# Patient Record
Sex: FEMALE | Race: WHITE | NOT HISPANIC OR LATINO | Employment: UNEMPLOYED | URBAN - METROPOLITAN AREA
[De-identification: names, ages, dates, MRNs, and addresses within clinical notes are randomized per-mention and may not be internally consistent; named-entity substitution may affect disease eponyms.]

---

## 2022-11-11 ENCOUNTER — OFFICE VISIT (OUTPATIENT)
Dept: MEDICAL GROUP | Facility: PHYSICIAN GROUP | Age: 62
End: 2022-11-11
Payer: COMMERCIAL

## 2022-11-11 VITALS
WEIGHT: 163.6 LBS | RESPIRATION RATE: 16 BRPM | DIASTOLIC BLOOD PRESSURE: 60 MMHG | TEMPERATURE: 97.8 F | SYSTOLIC BLOOD PRESSURE: 122 MMHG | HEART RATE: 74 BPM | OXYGEN SATURATION: 98 % | BODY MASS INDEX: 24.23 KG/M2 | HEIGHT: 69 IN

## 2022-11-11 DIAGNOSIS — Z01.84 IMMUNITY STATUS TESTING: ICD-10-CM

## 2022-11-11 DIAGNOSIS — Z00.00 HEALTHCARE MAINTENANCE: ICD-10-CM

## 2022-11-11 DIAGNOSIS — B07.9 VIRAL WARTS, UNSPECIFIED TYPE: ICD-10-CM

## 2022-11-11 DIAGNOSIS — Z23 NEED FOR VACCINATION: ICD-10-CM

## 2022-11-11 DIAGNOSIS — Z12.31 ENCOUNTER FOR SCREENING MAMMOGRAM FOR MALIGNANT NEOPLASM OF BREAST: ICD-10-CM

## 2022-11-11 DIAGNOSIS — R31.9 HEMATURIA, UNSPECIFIED TYPE: ICD-10-CM

## 2022-11-11 DIAGNOSIS — Z11.59 ENCOUNTER FOR HEPATITIS C SCREENING TEST FOR LOW RISK PATIENT: ICD-10-CM

## 2022-11-11 LAB
APPEARANCE UR: NORMAL
BILIRUB UR STRIP-MCNC: NEGATIVE MG/DL
COLOR UR AUTO: NORMAL
GLUCOSE UR STRIP.AUTO-MCNC: NEGATIVE MG/DL
KETONES UR STRIP.AUTO-MCNC: NORMAL MG/DL
LEUKOCYTE ESTERASE UR QL STRIP.AUTO: NORMAL
NITRITE UR QL STRIP.AUTO: NEGATIVE
PH UR STRIP.AUTO: 6.5 [PH] (ref 5–8)
PROT UR QL STRIP: NEGATIVE MG/DL
RBC UR QL AUTO: NEGATIVE
SP GR UR STRIP.AUTO: 1.02
UROBILINOGEN UR STRIP-MCNC: NORMAL MG/DL

## 2022-11-11 PROCEDURE — 17110 DESTRUCTION B9 LES UP TO 14: CPT | Performed by: STUDENT IN AN ORGANIZED HEALTH CARE EDUCATION/TRAINING PROGRAM

## 2022-11-11 PROCEDURE — 90715 TDAP VACCINE 7 YRS/> IM: CPT | Performed by: STUDENT IN AN ORGANIZED HEALTH CARE EDUCATION/TRAINING PROGRAM

## 2022-11-11 PROCEDURE — 99204 OFFICE O/P NEW MOD 45 MIN: CPT | Mod: 25 | Performed by: STUDENT IN AN ORGANIZED HEALTH CARE EDUCATION/TRAINING PROGRAM

## 2022-11-11 PROCEDURE — 90471 IMMUNIZATION ADMIN: CPT | Performed by: STUDENT IN AN ORGANIZED HEALTH CARE EDUCATION/TRAINING PROGRAM

## 2022-11-11 PROCEDURE — 81002 URINALYSIS NONAUTO W/O SCOPE: CPT | Performed by: STUDENT IN AN ORGANIZED HEALTH CARE EDUCATION/TRAINING PROGRAM

## 2022-11-11 RX ORDER — CEPHALEXIN 500 MG/1
500 CAPSULE ORAL 2 TIMES DAILY
Qty: 6 CAPSULE | Refills: 0 | Status: SHIPPED | OUTPATIENT
Start: 2022-11-11 | End: 2022-12-07

## 2022-11-11 RX ORDER — LORATADINE 10 MG/1
TABLET ORAL
COMMUNITY

## 2022-11-11 ASSESSMENT — PATIENT HEALTH QUESTIONNAIRE - PHQ9: CLINICAL INTERPRETATION OF PHQ2 SCORE: 0

## 2022-11-11 NOTE — PROGRESS NOTES
Subjective:     CC:  Diagnoses of Hematuria, unspecified type, Encounter for screening mammogram for malignant neoplasm of breast, Need for vaccination, Encounter for hepatitis C screening test for low risk patient, Immunity status testing, Healthcare maintenance, and Viral warts, unspecified type were pertinent to this visit.    HISTORY OF THE PRESENT ILLNESS: Patient is a 62 y.o. female.  This patient is new to me today.  She does not have a current woman's health provider.    This pleasant patient is here today to discuss:    1. Hematuria, unspecified type  Symptoms developed 2 days ago.  Burning sensation with urination.  Suprapubic pain.  Changes in the color and odor of her urine including what she believed to be blood.  She denies flank pain or fever.    2. Encounter for screening mammogram for malignant neoplasm of breast    3. Need for vaccination  Patient desires her Tdap vaccine today.  She is considering the Shingrix vaccine.  She declines influenza vaccine.    4. Encounter for hepatitis C screening test for low risk patient    5. Immunity status testing  Patient believes that she received the hepatitis B vaccine as it was required to be educated.    6. Healthcare maintenance    7. Viral warts, unspecified type  Left side ring finger palmar side.    Active Diagnosis:    There is no problem list on file for this patient.     Current Outpatient Medications Ordered in Epic   Medication Sig Dispense Refill    loratadine (CLARITIN) 10 MG Tab Take  by mouth.      cephALEXin (KEFLEX) 500 MG Cap Take 1 Capsule by mouth 2 times a day. 6 Capsule 0    Zoster Vac Recomb Adjuvanted (SHINGRIX) 50 MCG/0.5ML Recon Susp Inject 0.5 mL into the shoulder, thigh, or buttocks one time for 1 dose. 0.5 mL 0     No current Epic-ordered facility-administered medications on file.     ROS:   Gen: No fevers/chills, no changes in weight  HEENT: No changes in vision/hearing, sore throat.  Pulm: No cough, unexplained SOB.  CV: No chest  "pain/pressure, no palpitations  GI: No nausea/vomiting, no diarrhea  : See HPI.  MSk: No myalgias  Skin: No rash/skin changes.  See HPI.  Neuro: No headaches, no numbness/tingling  Heme/Lymph: no easy bruising      Objective:     Exam: /60 (BP Location: Left arm, Patient Position: Sitting, BP Cuff Size: Adult)   Pulse 74   Temp 36.6 °C (97.8 °F) (Temporal)   Resp 16   Ht 1.74 m (5' 8.5\")   Wt 74.2 kg (163 lb 9.6 oz)   SpO2 98%  Body mass index is 24.51 kg/m².    General: Normal appearing. No distress.  HEENT: Normocephalic. Eyes conjunctiva clear lids without ptosis. Pupils equal and reactive to light accommodation. Ears normal shape and contour. Canals are clear bilaterally, tympanic membranes are benign. Nasal mucosa benign, oropharynx is without erythema, edema or exudates.   Neck: Supple without JVD. Thyroid is not enlarged.  Pulmonary: Clear to ausculation.  Normal effort. No rales, ronchi, or wheezing.  Cardiovascular: Regular rate and rhythm without murmur. Radial pulses are intact and equal bilaterally.  Abdomen: Nondistended   neurologic: Grossly nonfocal.  CN II through XII intact.  Lymph: No cervical or supraclavicular lymph nodes are palpable  Skin: Warm and dry.  Obvious viral wart of the left ring finger, palmar side.  Musculoskeletal: Normal gait. No extremity cyanosis, clubbing, or edema.  Psych: Normal mood and affect. Alert and oriented x3. Judgment and insight are normal.    A chaperone was offered to the patient during today's exam. Patient declined chaperone.    Labs:   11/11/2022:  -POCT UA, glucose negative, bilirubin negative, ketones trace, specific gravity 1.020, blood negative, pH 6.5, protein negative, urobilinogen 1.0, nitrate negative, leukocyte esterase small.    Assessment & Plan:   62 y.o. female with the following -    1. Hematuria, unspecified type  - Acute uncomplicated illness.  - POCT Urinalysis  - cephALEXin (KEFLEX) 500 MG Cap; Take 1 Capsule by mouth 2 times a " day.  Dispense: 6 Capsule; Refill: 0    2. Encounter for screening mammogram for malignant neoplasm of breast  - MA-SCREENING MAMMO BILAT W/TOMOSYNTHESIS W/CAD; Future    3. Need for vaccination  - Tdap =>8yo IM vaccine provided in clinic today.  - Zoster Vac Recomb Adjuvanted (SHINGRIX) 50 MCG/0.5ML Recon Susp; Inject 0.5 mL into the shoulder, thigh, or buttocks one time for 1 dose.  Dispense: 0.5 mL; Refill: 0 Patient was provided with education about this vaccine.    4. Encounter for hepatitis C screening test for low risk patient  - HCV Scrn ( [id number] 1xLife); Future    5. Immunity status testing  - HEP B SURFACE AB; Future    6. Healthcare maintenance  -Patient believes she has 3 more years before she is due for another colonoscopy.  She was given a 10-year return time.  I have asked her to return records.  - CBC WITHOUT DIFFERENTIAL; Future  - Comp Metabolic Panel; Future  - Lipid Profile; Future  - VITAMIN D,25 HYDROXY (DEFICIENCY); Future    7. Viral warts, unspecified type  -Acute uncomplicated illness.  -Cryotherapy provided in office today, see procedure note.    Return in about 2 weeks (around 2022) for PAP.    Please note that this dictation was created using voice recognition software. I have made every reasonable attempt to correct obvious errors, but I expect that there are errors of grammar and possibly content that I did not discover before finalizing the note.      Bernard Reyse PA-C 2022    I have reviewed and agree with history, assessment and plan for office encounter on 2022 with Advanced Practice Provider: Bernard Reyes PA-C.  Face to face encounter/direct observation: No  Suggested changes to plan or follow-up: none   Kika Waggoner M.D.

## 2022-11-11 NOTE — PROCEDURES
CRYOTHERAPY:  Discussed the benign nature of these lesions.  After discussing risks and benefits, verbal consent was obtained and cryotherapy performed using liquid nitrogen, freeze-thaw-freeze technique x 3.  Patient tolerated the procedure well.  Aftercare as well as potential for blistering was discussed.  I explained that the procedure may need to be repeated if there is not complete resolution.    Location:  Left ring finger, plantar side.  1 lesion.    Bernard Reyes PA-C.

## 2022-12-07 ENCOUNTER — OFFICE VISIT (OUTPATIENT)
Dept: MEDICAL GROUP | Facility: PHYSICIAN GROUP | Age: 62
End: 2022-12-07
Payer: COMMERCIAL

## 2022-12-07 ENCOUNTER — HOSPITAL ENCOUNTER (OUTPATIENT)
Facility: MEDICAL CENTER | Age: 62
End: 2022-12-07
Attending: FAMILY MEDICINE
Payer: COMMERCIAL

## 2022-12-07 VITALS
DIASTOLIC BLOOD PRESSURE: 60 MMHG | BODY MASS INDEX: 23.99 KG/M2 | HEIGHT: 69 IN | TEMPERATURE: 97.2 F | WEIGHT: 162 LBS | OXYGEN SATURATION: 96 % | HEART RATE: 69 BPM | SYSTOLIC BLOOD PRESSURE: 138 MMHG

## 2022-12-07 DIAGNOSIS — Z12.4 SCREENING FOR CERVICAL CANCER: ICD-10-CM

## 2022-12-07 DIAGNOSIS — Z01.419 WELL WOMAN EXAM: Primary | ICD-10-CM

## 2022-12-07 DIAGNOSIS — Z11.51 SCREENING FOR HPV (HUMAN PAPILLOMAVIRUS): ICD-10-CM

## 2022-12-07 PROCEDURE — 87624 HPV HI-RISK TYP POOLED RSLT: CPT

## 2022-12-07 PROCEDURE — 99396 PREV VISIT EST AGE 40-64: CPT | Performed by: FAMILY MEDICINE

## 2022-12-07 PROCEDURE — 88175 CYTOPATH C/V AUTO FLUID REDO: CPT

## 2022-12-07 NOTE — PROGRESS NOTES
Subjective:     CC:   Chief Complaint   Patient presents with    Annual Exam    Gynecologic Exam       HPI:   Sandhya Christine is a 62 y.o. female who presents for annual exam. She is feeling well and denies any complaints.    Ob-Gyn/ History:    Patient has GYN provider: no  /Para:  2/2  Last Pap Smear:  Unknown. No history of abnormal pap smears.  Gyn Surgery:  No.  Current Contraceptive Method:  N/a. Yes currently sexually active.  Last menstrual period:  ~.  No significant bloating/fluid retention, pelvic pain. +dyspareunia. No vaginal discharge  Post-menopausal bleeding: no  Urinary incontinence: yes - stress & urge  Folate intake: n/a     Health Maintenance  Advanced directive: n/a   Osteoporosis Screen/ DEXA: n/a   PT/vit D for falls prevention: n/a   Cholesterol Screening: ordered   Diabetes Screening: ordered   Aspirin Use: n/a      Anticipatory Guidance  Diet: trying to eat healthy   Exercise: regular   Substance Abuse: No   Safe in relationship.   Seat belts, bike helmet, gun safety discussed.  Sun protection used.    Cancer screening  Colorectal Cancer Screening: reports completed 7-8 years ago    Lung Cancer Screening: n/a - never smoker    Cervical Cancer Screening: today   Breast Cancer Screening: completed     Infectious disease screening/Immunizations  --STI Screening: declined   --Practices safe sex.  --HIV Screening: declined   --Hepatitis C Screening: ordered   --Immunizations:    Influenza: declined    HPV:  n/a    Tetanus: due     Shingles: recommended   Pneumococcal : n/a     Hepatitis B: checking titer  COVID-19: declined  Other immunizations: n/a     She  has no past medical history on file.  She  has no past surgical history on file.    Family History   Problem Relation Age of Onset    Throat Cancer Father        Social History     Socioeconomic History    Marital status:      Spouse name: Not on file    Number of children: Not on file    Years of education: Not on  "file    Highest education level: Not on file   Occupational History    Not on file   Tobacco Use    Smoking status: Never    Smokeless tobacco: Never   Vaping Use    Vaping Use: Never used   Substance and Sexual Activity    Alcohol use: Yes     Alcohol/week: 4.2 oz     Types: 7 Glasses of wine per week     Comment: 7/week    Drug use: Never    Sexual activity: Not on file   Other Topics Concern    Not on file   Social History Narrative    Not on file     Social Determinants of Health     Financial Resource Strain: Not on file   Food Insecurity: Not on file   Transportation Needs: Not on file   Physical Activity: Not on file   Stress: Not on file   Social Connections: Not on file   Intimate Partner Violence: Not on file   Housing Stability: Not on file       There are no problems to display for this patient.        Current Outpatient Medications   Medication Sig Dispense Refill    Fluticasone Propionate (FLONASE NA) Administer  into affected nostril(S).      loratadine (CLARITIN) 10 MG Tab Take  by mouth.       No current facility-administered medications for this visit.     No Known Allergies    Review of Systems   Constitutional: Negative for fever, chills.   HENT: Negative for congestion.    Eyes: Negative for pain.    Respiratory: Negative for cough  Cardiovascular: Negative for leg swelling.   Gastrointestinal: Negative for nausea, vomiting.   Genitourinary: Negative for dysuria.   Skin: Negative for rash.   Neurological: Negative for dizziness.   Endo/Heme/Allergies: Does not bleed easily.   Psychiatric/Behavioral: Negative for depression.  The patient is not nervous/anxious.      Objective:     /60 (BP Location: Right arm, Patient Position: Sitting, BP Cuff Size: Adult)   Pulse 69   Temp 36.2 °C (97.2 °F) (Temporal)   Ht 1.74 m (5' 8.5\")   Wt 73.5 kg (162 lb)   SpO2 96%   BMI 24.27 kg/m²   Body mass index is 24.27 kg/m².  Wt Readings from Last 4 Encounters:   12/07/22 73.5 kg (162 lb)   11/11/22 " 74.2 kg (163 lb 9.6 oz)       Physical Exam:  Constitutional: Well-developed and well-nourished. Not diaphoretic. No distress.   Skin: Skin is warm and dry. No rash noted.  Head: Atraumatic without lesions.  Eyes: Conjunctivae and extraocular motions are normal. Pupils are equal, round, and reactive to light. No scleral icterus.   Ears:  External ears unremarkable. Tympanic membranes clear and intact.   Mouth/Throat: Dentition is good. Tongue normal. Oropharynx is clear and moist. Posterior pharynx without erythema or exudates.  Neck: Supple, trachea midline. Normal range of motion. No thyromegaly present. No lymphadenopathy--cervical or supraclavicular.  Cardiovascular: Regular rate and rhythm, S1 and S2 without murmur, rubs, or gallops.  Lungs: Normal inspiratory effort, CTA bilaterally, no wheezes/rhonchi/rales  Abdomen: Soft, non tender, and without distention. Active bowel sounds in all four quadrants. No rebound, guarding, masses or HSM.  :Perineum and external genitalia normal without rash. Vagina with normal and physiologic discharge. Cervix without visible lesions or discharge.  Extremities: No cyanosis, clubbing, erythema, nor edema. Distal pulses intact and symmetric.   Musculoskeletal: All major joints AROM full in all directions without pain.  Neurological: Alert and oriented x 3. DTRs 2+/3 and symmetric. No cranial nerve deficit. 5/5 myotomes. Sensation intact.   Psychiatric:  Behavior, mood, and affect are appropriate.    A chaperone was offered to the patient during today's exam. Chaperone name: Heidy Salazar was present.    Assessment and Plan:     1. Well woman exam        2. Screening for cervical cancer  THINPREP PAP WITH HPV      3. Screening for HPV (human papillomavirus)  THINPREP PAP WITH HPV          HCM:  up to date   Labs per orders  Immunizations per orders  Patient counseled about skin care, diet, supplements, prenatal vitamins, safe sex and exercise.    Follow-up: Return if symptoms  worsen or fail to improve.

## 2022-12-08 DIAGNOSIS — Z12.4 SCREENING FOR CERVICAL CANCER: ICD-10-CM

## 2022-12-08 DIAGNOSIS — Z11.51 SCREENING FOR HPV (HUMAN PAPILLOMAVIRUS): ICD-10-CM

## 2022-12-08 LAB
CYTOLOGY REG CYTOL: NORMAL
HPV HR 12 DNA CVX QL NAA+PROBE: NEGATIVE
HPV16 DNA SPEC QL NAA+PROBE: NEGATIVE
HPV18 DNA SPEC QL NAA+PROBE: NEGATIVE
SPECIMEN SOURCE: NORMAL

## 2023-07-06 ENCOUNTER — OFFICE VISIT (OUTPATIENT)
Dept: MEDICAL GROUP | Facility: PHYSICIAN GROUP | Age: 63
End: 2023-07-06
Payer: COMMERCIAL

## 2023-07-06 VITALS
OXYGEN SATURATION: 96 % | HEIGHT: 69 IN | DIASTOLIC BLOOD PRESSURE: 78 MMHG | WEIGHT: 172.6 LBS | SYSTOLIC BLOOD PRESSURE: 130 MMHG | HEART RATE: 86 BPM | TEMPERATURE: 97.9 F | BODY MASS INDEX: 25.56 KG/M2

## 2023-07-06 DIAGNOSIS — R30.0 DYSURIA: ICD-10-CM

## 2023-07-06 LAB
APPEARANCE UR: CLEAR
BILIRUB UR STRIP-MCNC: NEGATIVE MG/DL
COLOR UR AUTO: YELLOW
GLUCOSE UR STRIP.AUTO-MCNC: NEGATIVE MG/DL
KETONES UR STRIP.AUTO-MCNC: NEGATIVE MG/DL
LEUKOCYTE ESTERASE UR QL STRIP.AUTO: NORMAL
NITRITE UR QL STRIP.AUTO: NEGATIVE
PH UR STRIP.AUTO: 7 [PH] (ref 5–8)
PROT UR QL STRIP: NEGATIVE MG/DL
RBC UR QL AUTO: NEGATIVE
SP GR UR STRIP.AUTO: 1.02
UROBILINOGEN UR STRIP-MCNC: 4 MG/DL

## 2023-07-06 PROCEDURE — 99213 OFFICE O/P EST LOW 20 MIN: CPT | Performed by: STUDENT IN AN ORGANIZED HEALTH CARE EDUCATION/TRAINING PROGRAM

## 2023-07-06 PROCEDURE — 3078F DIAST BP <80 MM HG: CPT | Performed by: STUDENT IN AN ORGANIZED HEALTH CARE EDUCATION/TRAINING PROGRAM

## 2023-07-06 PROCEDURE — 81002 URINALYSIS NONAUTO W/O SCOPE: CPT | Performed by: STUDENT IN AN ORGANIZED HEALTH CARE EDUCATION/TRAINING PROGRAM

## 2023-07-06 PROCEDURE — 3075F SYST BP GE 130 - 139MM HG: CPT | Performed by: STUDENT IN AN ORGANIZED HEALTH CARE EDUCATION/TRAINING PROGRAM

## 2023-07-06 RX ORDER — CEPHALEXIN 500 MG/1
500 CAPSULE ORAL 2 TIMES DAILY
Qty: 6 CAPSULE | Refills: 0 | Status: SHIPPED | OUTPATIENT
Start: 2023-07-06 | End: 2023-08-10

## 2023-07-06 ASSESSMENT — PATIENT HEALTH QUESTIONNAIRE - PHQ9: CLINICAL INTERPRETATION OF PHQ2 SCORE: 0

## 2023-07-07 NOTE — PROGRESS NOTES
"CC:  The encounter diagnosis was Dysuria.    HISTORY OF THE PRESENT ILLNESS: Patient is a 63 y.o. female. This pleasant patient is here today to discuss:    1. Dysuria  Onset symptoms approximately 2 days ago.    Patient endorses bladder cramping, frequency, urgency.    No change in color or odor of her urine, no fever, no flank pain.    This feels similar to her previous urinary tract infections.    Active Diagnosis:    There is no problem list on file for this patient.     Current Outpatient Medications Ordered in Epic   Medication Sig Dispense Refill    cephALEXin (KEFLEX) 500 MG Cap Take 1 Capsule by mouth 2 times a day. 6 Capsule 0    Fluticasone Propionate (FLONASE NA) Administer  into affected nostril(S).      loratadine (CLARITIN) 10 MG Tab Take  by mouth.       No current Epic-ordered facility-administered medications on file.     ROS:   See HPI.    Objective:     Exam: /78 (BP Location: Left arm, Patient Position: Sitting, BP Cuff Size: Adult)   Pulse 86   Temp 36.6 °C (97.9 °F) (Temporal)   Ht 1.74 m (5' 8.5\")   Wt 78.3 kg (172 lb 9.6 oz)   SpO2 96%  Body mass index is 25.86 kg/m².    General: Normal appearing. No distress.  Abdomen: Nondistended.  No suprapubic tenderness to palpation.  No CVA tenderness to percussion.  Neurologic: Grossly nonfocal.    Skin: Warm and dry.  No obvious lesions.  Musculoskeletal: No extremity cyanosis, clubbing, or edema.  Psych: Normal mood and affect.             Assessment & Plan:   63 y.o. female with the following -    Labs:   6/20/2023:  -POCT UA showing glucose negative, bilirubin negative, ketones negative, specific gravity 1.020, blood negative, pH 7.0, protein negative, urobilinogen 4.0, nitrate negative, leukocyte esterases small.    1. Dysuria  -Acute uncomplicated illness.  -Keflex 500 mg twice daily for 3 days.  Dispense 6.  Refill 0.  - POCT Urinalysis    Return if symptoms worsen or fail to improve.    Please note that this dictation was created " using voice recognition software. I have made every reasonable attempt to correct obvious errors, but I expect that there are errors of grammar and possibly content that I did not discover before finalizing the note.      Bernard Reyes PA-C 7/6/2023

## 2023-08-02 ENCOUNTER — DOCUMENTATION (OUTPATIENT)
Dept: HEALTH INFORMATION MANAGEMENT | Facility: OTHER | Age: 63
End: 2023-08-02
Payer: COMMERCIAL

## 2023-08-10 ENCOUNTER — OFFICE VISIT (OUTPATIENT)
Dept: MEDICAL GROUP | Facility: PHYSICIAN GROUP | Age: 63
End: 2023-08-10
Payer: COMMERCIAL

## 2023-08-10 VITALS
WEIGHT: 167.4 LBS | DIASTOLIC BLOOD PRESSURE: 80 MMHG | HEIGHT: 69 IN | SYSTOLIC BLOOD PRESSURE: 140 MMHG | TEMPERATURE: 97.5 F | HEART RATE: 68 BPM | OXYGEN SATURATION: 94 % | BODY MASS INDEX: 24.79 KG/M2

## 2023-08-10 DIAGNOSIS — Z12.11 SCREENING FOR COLON CANCER: ICD-10-CM

## 2023-08-10 DIAGNOSIS — H10.31 ACUTE CONJUNCTIVITIS OF RIGHT EYE, UNSPECIFIED ACUTE CONJUNCTIVITIS TYPE: ICD-10-CM

## 2023-08-10 DIAGNOSIS — Z13.6 SCREENING FOR CARDIOVASCULAR CONDITION: ICD-10-CM

## 2023-08-10 DIAGNOSIS — Z12.12 SCREENING FOR COLORECTAL CANCER: ICD-10-CM

## 2023-08-10 DIAGNOSIS — Z13.0 SCREENING FOR DEFICIENCY ANEMIA: ICD-10-CM

## 2023-08-10 DIAGNOSIS — Z12.11 SCREENING FOR COLORECTAL CANCER: ICD-10-CM

## 2023-08-10 DIAGNOSIS — N95.2 VAGINAL ATROPHY: ICD-10-CM

## 2023-08-10 DIAGNOSIS — I10 ELEVATED SYSTOLIC BLOOD PRESSURE READING WITH DIAGNOSIS OF HYPERTENSION: ICD-10-CM

## 2023-08-10 DIAGNOSIS — N39.0 RECURRENT UTI: ICD-10-CM

## 2023-08-10 DIAGNOSIS — Z13.228 SCREENING FOR METABOLIC DISORDER: ICD-10-CM

## 2023-08-10 PROCEDURE — 99214 OFFICE O/P EST MOD 30 MIN: CPT

## 2023-08-10 PROCEDURE — 3079F DIAST BP 80-89 MM HG: CPT

## 2023-08-10 PROCEDURE — 3077F SYST BP >= 140 MM HG: CPT

## 2023-08-10 RX ORDER — ESTRADIOL 0.1 MG/G
1 CREAM VAGINAL
Qty: 42.5 G | Refills: 0 | Status: SHIPPED | OUTPATIENT
Start: 2023-08-10 | End: 2024-01-25

## 2023-08-10 RX ORDER — CIPROFLOXACIN HYDROCHLORIDE 3.5 MG/ML
1 SOLUTION/ DROPS TOPICAL 4 TIMES DAILY
Qty: 2.5 ML | Refills: 0 | Status: SHIPPED | OUTPATIENT
Start: 2023-08-10 | End: 2023-08-11 | Stop reason: SDUPTHER

## 2023-08-10 ASSESSMENT — ENCOUNTER SYMPTOMS
PHOTOPHOBIA: 1
EYE PAIN: 1
EYE REDNESS: 1
DOUBLE VISION: 0
EYE DISCHARGE: 1
BLURRED VISION: 0

## 2023-08-10 NOTE — PROGRESS NOTES
"Subjective:     CC:  Diagnoses of Acute conjunctivitis of right eye, unspecified acute conjunctivitis type, Elevated systolic blood pressure reading with diagnosis of hypertension, Vaginal atrophy, Recurrent UTI, Screening for colorectal cancer, Screening for colon cancer, Screening for cardiovascular condition, Screening for deficiency anemia, and Screening for metabolic disorder were pertinent to this visit.    HISTORY OF THE PRESENT ILLNESS: Patient is a 63 y.o. female. This pleasant patient is here today to establish care and discuss the following problems:    Problem   Acute Conjunctivitis of Right Eye    Reports 3 days of right eye irritation  -Wears contacts daily (monthly version) bifocal- has taken them out and is not wearing since this started  -Seen by optometrist in June 2023, normal check up no concerns  -Reports eye irritation, redness, photophobia, \"Feels like a milky film\" when looking into magnifying mirror., Did wake with crusting this morning.  -Does report blurred vision of right eye (worse than normal)  -Using warm and cold compress which has been helpful       Elevated Systolic Blood Pressure Reading With Diagnosis of Hypertension    /80 in clinic, not on BP meds  No current symptoms     Recurrent Uti    Patient reports history of chronic UTIs.  She has had great difficulty with this the past few years despite use of cranberry pills daily.  She has no symptoms today other than complaints of vaginal atrophy.     Vaginal Atrophy    Chronic, uncontrolled patient reports great discomfort during sex due to vaginal dryness.  -This is inhibiting her from wanting to have sexual intercourse with her .  -She is amendable to trial of vaginal estrogen for both this and recurrent UTIs.         Health Maintenance: Completed    ROS:   Review of Systems   Eyes:  Positive for photophobia, pain, discharge and redness. Negative for blurred vision and double vision.         Objective:     Exam: BP (!) " "140/80 (BP Location: Left arm, Patient Position: Sitting, BP Cuff Size: Adult)   Pulse 68   Temp 36.4 °C (97.5 °F) (Temporal)   Ht 1.74 m (5' 8.5\")   Wt 75.9 kg (167 lb 6.4 oz)   SpO2 94%  Body mass index is 25.08 kg/m².    Physical Exam  Constitutional:       General: She is not in acute distress.     Appearance: Normal appearance. She is not ill-appearing or toxic-appearing.   HENT:      Head: Normocephalic.   Eyes:      Conjunctiva/sclera: Conjunctivae normal.   Cardiovascular:      Rate and Rhythm: Normal rate and regular rhythm.      Pulses: Normal pulses.      Heart sounds: Normal heart sounds. No murmur heard.  Pulmonary:      Effort: Pulmonary effort is normal. No respiratory distress.      Breath sounds: Normal breath sounds.   Skin:     General: Skin is warm and dry.   Neurological:      General: No focal deficit present.      Mental Status: She is alert and oriented to person, place, and time.   Psychiatric:         Mood and Affect: Mood normal.         Behavior: Behavior normal.           Labs: No recent labs    Assessment & Plan: Medical Decision Making   63 y.o. female with the following -    Problem List Items Addressed This Visit       Acute conjunctivitis of right eye     Acute condition-uncomplicated  -Will prescribe ciprofloxacin 1 drop to be applied 4 times daily for 1 week to right eye.  -Patient strongly advised to follow-up with optometrist as discussed  -ED precautions given and known for worsening or progression of condition including any loss of vision          Relevant Medications    ciprofloxacin (CILOXIN) 0.3 % Solution    Other Relevant Orders    CBC WITHOUT DIFFERENTIAL    Elevated systolic blood pressure reading with diagnosis of hypertension     Undiagnosed condition, uncertain prognosis  Recommend home blood pressure monitoring:  - check your blood pressure every day and put it in a log  - pick a different time each day so we can see how it varies throughout the day  - when you " check your blood pressure: make sure you sit quietly for 5-10 minutes beforehand, keep both feet flat on the ground, and make sure you use an arm cuff at heart height    Lifestyle factors to help manage blood pressure:  1) reduce stress with daily activity, consider yoga, or meditation  2) reduce Na to <2000 mg/day-avoid putting extra salt on food, avoid packaged/processed foods, avoid excessive sugar intake  3) Mediterranean diet   4) 30 minutes of cardio and resistance training most days of the week, which you can build up to at least 150 min./week                 Relevant Orders    CBC WITHOUT DIFFERENTIAL    Comp Metabolic Panel    Recurrent UTI     Chronic uncontrolled patient amendable to trial of estradiol vaginal estrogen 1 g to be applied to vagina once daily for 2 weeks and then once every 72 hours.  Side effects of this medication discussed.  -Patient also advised to make sure to urinate prior to sexual intercourse and after sexual intercourse.  -If UTIs continue we will consider referral to urology         Relevant Medications    estradiol (ESTRACE) 0.1 MG/GM vaginal cream    Other Relevant Orders    CBC WITHOUT DIFFERENTIAL    Comp Metabolic Panel    Vaginal atrophy     Chronic condition uncontrolled  -Estradiol to be applied to vagina 1 g once daily for 2 weeks then patient to decrease dose to 1 g every 72 hours.           Relevant Medications    estradiol (ESTRACE) 0.1 MG/GM vaginal cream    Other Relevant Orders    CBC WITHOUT DIFFERENTIAL    Comp Metabolic Panel     Other Visit Diagnoses       Screening for colorectal cancer        Screening for colon cancer        Relevant Orders    COLOGUARD (FIT DNA)    Screening for cardiovascular condition        Relevant Orders    Comp Metabolic Panel    Lipid Profile    Screening for deficiency anemia        Relevant Orders    CBC WITHOUT DIFFERENTIAL    Screening for metabolic disorder        Relevant Orders    Comp Metabolic Panel            Differential  diagnosis, natural history, supportive care, and indications for immediate follow-up discussed.  Shared decision making approach was utilized, and patient is amendable with plan of care.  Patient understands to return to clinic or go to the emergency department if symptoms worsen. All questions and concerns addressed to the best of my knowledge.      Return in about 3 months (around 11/10/2023) for F/U Lab Review.    Please note that this dictation was created using voice recognition software. I have made every reasonable attempt to correct obvious errors, but I expect that there are errors of grammar and possibly content that I did not discover before finalizing the note.

## 2023-08-11 DIAGNOSIS — H10.31 ACUTE CONJUNCTIVITIS OF RIGHT EYE, UNSPECIFIED ACUTE CONJUNCTIVITIS TYPE: ICD-10-CM

## 2023-08-11 RX ORDER — CIPROFLOXACIN HYDROCHLORIDE 3.5 MG/ML
1 SOLUTION/ DROPS TOPICAL 4 TIMES DAILY
Qty: 2.5 ML | Refills: 0 | Status: SHIPPED | OUTPATIENT
Start: 2023-08-11 | End: 2023-08-18

## 2023-08-11 RX ORDER — CIPROFLOXACIN HYDROCHLORIDE 3.5 MG/ML
1 SOLUTION/ DROPS TOPICAL 4 TIMES DAILY
Qty: 2.5 ML | Refills: 0 | Status: SHIPPED | OUTPATIENT
Start: 2023-08-11 | End: 2023-08-11 | Stop reason: SDUPTHER

## 2023-08-11 NOTE — ASSESSMENT & PLAN NOTE
Chronic uncontrolled patient amendable to trial of estradiol vaginal estrogen 1 g to be applied to vagina once daily for 2 weeks and then once every 72 hours.  Side effects of this medication discussed.  -Patient also advised to make sure to urinate prior to sexual intercourse and after sexual intercourse.  -If UTIs continue we will consider referral to urology

## 2023-08-11 NOTE — ASSESSMENT & PLAN NOTE
Undiagnosed condition, uncertain prognosis  Recommend home blood pressure monitoring:  - check your blood pressure every day and put it in a log  - pick a different time each day so we can see how it varies throughout the day  - when you check your blood pressure: make sure you sit quietly for 5-10 minutes beforehand, keep both feet flat on the ground, and make sure you use an arm cuff at heart height    Lifestyle factors to help manage blood pressure:  1) reduce stress with daily activity, consider yoga, or meditation  2) reduce Na to <2000 mg/day-avoid putting extra salt on food, avoid packaged/processed foods, avoid excessive sugar intake  3) Mediterranean diet   4) 30 minutes of cardio and resistance training most days of the week, which you can build up to at least 150 min./week

## 2023-08-11 NOTE — ASSESSMENT & PLAN NOTE
Chronic condition uncontrolled  -Estradiol to be applied to vagina 1 g once daily for 2 weeks then patient to decrease dose to 1 g every 72 hours.

## 2023-08-11 NOTE — ASSESSMENT & PLAN NOTE
Acute condition-uncomplicated  -Will prescribe ciprofloxacin 1 drop to be applied 4 times daily for 1 week to right eye.  -Patient strongly advised to follow-up with optometrist as discussed  -ED precautions given and known for worsening or progression of condition including any loss of vision

## 2023-08-14 ENCOUNTER — HOSPITAL ENCOUNTER (OUTPATIENT)
Dept: LAB | Facility: MEDICAL CENTER | Age: 63
End: 2023-08-14
Payer: COMMERCIAL

## 2023-08-14 DIAGNOSIS — Z13.228 SCREENING FOR METABOLIC DISORDER: ICD-10-CM

## 2023-08-14 DIAGNOSIS — I10 ELEVATED SYSTOLIC BLOOD PRESSURE READING WITH DIAGNOSIS OF HYPERTENSION: ICD-10-CM

## 2023-08-14 DIAGNOSIS — N95.2 VAGINAL ATROPHY: ICD-10-CM

## 2023-08-14 DIAGNOSIS — Z13.0 SCREENING FOR DEFICIENCY ANEMIA: ICD-10-CM

## 2023-08-14 DIAGNOSIS — H10.31 ACUTE CONJUNCTIVITIS OF RIGHT EYE, UNSPECIFIED ACUTE CONJUNCTIVITIS TYPE: ICD-10-CM

## 2023-08-14 DIAGNOSIS — N39.0 RECURRENT UTI: ICD-10-CM

## 2023-08-14 DIAGNOSIS — Z13.6 SCREENING FOR CARDIOVASCULAR CONDITION: ICD-10-CM

## 2023-08-14 LAB
ALBUMIN SERPL BCP-MCNC: 4.6 G/DL (ref 3.2–4.9)
ALBUMIN/GLOB SERPL: 1.7 G/DL
ALP SERPL-CCNC: 60 U/L (ref 30–99)
ALT SERPL-CCNC: 35 U/L (ref 2–50)
ANION GAP SERPL CALC-SCNC: 13 MMOL/L (ref 7–16)
AST SERPL-CCNC: 35 U/L (ref 12–45)
BILIRUB SERPL-MCNC: 0.7 MG/DL (ref 0.1–1.5)
BUN SERPL-MCNC: 9 MG/DL (ref 8–22)
CALCIUM ALBUM COR SERPL-MCNC: 8.7 MG/DL (ref 8.5–10.5)
CALCIUM SERPL-MCNC: 9.2 MG/DL (ref 8.5–10.5)
CHLORIDE SERPL-SCNC: 104 MMOL/L (ref 96–112)
CHOLEST SERPL-MCNC: 256 MG/DL (ref 100–199)
CO2 SERPL-SCNC: 24 MMOL/L (ref 20–33)
CREAT SERPL-MCNC: 0.57 MG/DL (ref 0.5–1.4)
ERYTHROCYTE [DISTWIDTH] IN BLOOD BY AUTOMATED COUNT: 45.1 FL (ref 35.9–50)
FASTING STATUS PATIENT QL REPORTED: NORMAL
GFR SERPLBLD CREATININE-BSD FMLA CKD-EPI: 101 ML/MIN/1.73 M 2
GLOBULIN SER CALC-MCNC: 2.7 G/DL (ref 1.9–3.5)
GLUCOSE SERPL-MCNC: 101 MG/DL (ref 65–99)
HCT VFR BLD AUTO: 46.3 % (ref 37–47)
HDLC SERPL-MCNC: 73 MG/DL
HGB BLD-MCNC: 15.6 G/DL (ref 12–16)
LDLC SERPL CALC-MCNC: 166 MG/DL
MCH RBC QN AUTO: 34 PG (ref 27–33)
MCHC RBC AUTO-ENTMCNC: 33.7 G/DL (ref 32.2–35.5)
MCV RBC AUTO: 100.9 FL (ref 81.4–97.8)
PLATELET # BLD AUTO: 172 K/UL (ref 164–446)
PMV BLD AUTO: 9.7 FL (ref 9–12.9)
POTASSIUM SERPL-SCNC: 4.5 MMOL/L (ref 3.6–5.5)
PROT SERPL-MCNC: 7.3 G/DL (ref 6–8.2)
RBC # BLD AUTO: 4.59 M/UL (ref 4.2–5.4)
SODIUM SERPL-SCNC: 141 MMOL/L (ref 135–145)
TRIGL SERPL-MCNC: 83 MG/DL (ref 0–149)
WBC # BLD AUTO: 4.6 K/UL (ref 4.8–10.8)

## 2023-08-14 PROCEDURE — 36415 COLL VENOUS BLD VENIPUNCTURE: CPT

## 2023-08-14 PROCEDURE — 80053 COMPREHEN METABOLIC PANEL: CPT

## 2023-08-14 PROCEDURE — 80061 LIPID PANEL: CPT

## 2023-08-14 PROCEDURE — 85027 COMPLETE CBC AUTOMATED: CPT

## 2023-08-15 ENCOUNTER — PATIENT MESSAGE (OUTPATIENT)
Dept: MEDICAL GROUP | Facility: PHYSICIAN GROUP | Age: 63
End: 2023-08-15
Payer: COMMERCIAL

## 2023-11-14 ENCOUNTER — DOCUMENTATION (OUTPATIENT)
Dept: HEALTH INFORMATION MANAGEMENT | Facility: OTHER | Age: 63
End: 2023-11-14
Payer: COMMERCIAL

## 2024-01-25 PROBLEM — M17.11 ARTHRITIS OF RIGHT KNEE: Status: ACTIVE | Noted: 2024-01-25

## 2024-04-04 ENCOUNTER — APPOINTMENT (OUTPATIENT)
Dept: RADIOLOGY | Facility: MEDICAL CENTER | Age: 64
End: 2024-04-04
Attending: ORTHOPAEDIC SURGERY
Payer: COMMERCIAL

## 2024-04-08 ENCOUNTER — HOSPITAL ENCOUNTER (OUTPATIENT)
Dept: RADIOLOGY | Facility: MEDICAL CENTER | Age: 64
End: 2024-04-08
Attending: ORTHOPAEDIC SURGERY
Payer: MEDICARE

## 2024-04-08 DIAGNOSIS — Z01.818 PRE-OP EXAM: ICD-10-CM

## 2024-04-08 PROCEDURE — 73700 CT LOWER EXTREMITY W/O DYE: CPT | Mod: RT

## 2024-04-16 DIAGNOSIS — Z96.651 AFTERCARE FOLLOWING RIGHT KNEE JOINT REPLACEMENT SURGERY: ICD-10-CM

## 2024-04-16 DIAGNOSIS — Z47.1 AFTERCARE FOLLOWING RIGHT KNEE JOINT REPLACEMENT SURGERY: ICD-10-CM

## 2025-03-03 ENCOUNTER — HOSPITAL ENCOUNTER (OUTPATIENT)
Facility: MEDICAL CENTER | Age: 65
End: 2025-03-03
Attending: STUDENT IN AN ORGANIZED HEALTH CARE EDUCATION/TRAINING PROGRAM
Payer: MEDICARE

## 2025-03-03 ENCOUNTER — OFFICE VISIT (OUTPATIENT)
Dept: URGENT CARE | Facility: CLINIC | Age: 65
End: 2025-03-03
Payer: MEDICARE

## 2025-03-03 VITALS
WEIGHT: 155 LBS | SYSTOLIC BLOOD PRESSURE: 164 MMHG | HEART RATE: 84 BPM | TEMPERATURE: 97.6 F | HEIGHT: 69 IN | OXYGEN SATURATION: 97 % | BODY MASS INDEX: 22.96 KG/M2 | DIASTOLIC BLOOD PRESSURE: 88 MMHG | RESPIRATION RATE: 16 BRPM

## 2025-03-03 DIAGNOSIS — R30.0 DYSURIA: ICD-10-CM

## 2025-03-03 LAB
APPEARANCE UR: CLEAR
BILIRUB UR STRIP-MCNC: NEGATIVE MG/DL
COLOR UR AUTO: YELLOW
GLUCOSE UR STRIP.AUTO-MCNC: NEGATIVE MG/DL
KETONES UR STRIP.AUTO-MCNC: 15 MG/DL
LEUKOCYTE ESTERASE UR QL STRIP.AUTO: NORMAL
NITRITE UR QL STRIP.AUTO: NEGATIVE
PH UR STRIP.AUTO: 6 [PH] (ref 5–8)
PROT UR QL STRIP: NEGATIVE MG/DL
RBC UR QL AUTO: NEGATIVE
SP GR UR STRIP.AUTO: 1.01
UROBILINOGEN UR STRIP-MCNC: 0.2 MG/DL

## 2025-03-03 PROCEDURE — 99214 OFFICE O/P EST MOD 30 MIN: CPT | Performed by: STUDENT IN AN ORGANIZED HEALTH CARE EDUCATION/TRAINING PROGRAM

## 2025-03-03 PROCEDURE — 3077F SYST BP >= 140 MM HG: CPT | Performed by: STUDENT IN AN ORGANIZED HEALTH CARE EDUCATION/TRAINING PROGRAM

## 2025-03-03 PROCEDURE — 87086 URINE CULTURE/COLONY COUNT: CPT

## 2025-03-03 PROCEDURE — 3079F DIAST BP 80-89 MM HG: CPT | Performed by: STUDENT IN AN ORGANIZED HEALTH CARE EDUCATION/TRAINING PROGRAM

## 2025-03-03 PROCEDURE — 81002 URINALYSIS NONAUTO W/O SCOPE: CPT | Performed by: STUDENT IN AN ORGANIZED HEALTH CARE EDUCATION/TRAINING PROGRAM

## 2025-03-03 RX ORDER — CEPHALEXIN 500 MG/1
500 CAPSULE ORAL 2 TIMES DAILY
Qty: 10 CAPSULE | Refills: 0 | Status: SHIPPED | OUTPATIENT
Start: 2025-03-03 | End: 2025-03-08

## 2025-03-03 ASSESSMENT — FIBROSIS 4 INDEX: FIB4 SCORE: 2.24

## 2025-03-03 NOTE — PROGRESS NOTES
"Subjective:   Sandhya Christine is a 64 y.o. female who presents for UTI (Started with blood in urine , then progressed in cramping and blood x 1 week )      HPI:  64-year-old female presents to urgent care for approximately 1 week of intermittent lower abdominal cramping, blood in her urine, increased urinary frequency, and dysuria.  States that she has had UTIs in the past that presented the same as her current symptoms.  No fever, nausea, vomiting, diarrhea, flank pain, or back pain.    Medications:    cephALEXin Caps  docusate sodium Caps  FLONASE NA  loratadine Tabs  ondansetron Tbdp    Allergies: Patient has no known allergies.    Problem List: Sandhya Christine does not have any pertinent problems on file.    Surgical History:  Past Surgical History:   Procedure Laterality Date    PB TOTAL KNEE ARTHROPLASTY Right 5/1/2024    Procedure: RIGHT TOTAL KNEE ARTHROPLASTY;  Surgeon: Juno Templeton M.D.;  Location: [facility];  Service: Orthopedics    ORIF, SHOULDER Right        Past Social Hx: Sandhya Christine  reports that she has never smoked. She has never been exposed to tobacco smoke. She has never used smokeless tobacco. She reports current alcohol use of about 12.0 oz of alcohol per week. She reports that she does not use drugs.     Past Family Hx:  Sandhya Christine family history includes Cancer in her father; No Known Problems in her brother, mother, and sister; Throat Cancer in her father.     Problem list, medications, and allergies reviewed by myself today in Epic.     Objective:     BP (!) 164/88 (BP Location: Left arm, Patient Position: Sitting, BP Cuff Size: Adult)   Pulse 84   Temp 36.4 °C (97.6 °F) (Temporal)   Resp 16   Ht 1.753 m (5' 9\")   Wt 70.3 kg (155 lb)   SpO2 97%   BMI 22.89 kg/m²     Physical Exam  Vitals reviewed.   Cardiovascular:      Rate and Rhythm: Normal rate.      Pulses: Normal pulses.   Pulmonary:      Effort: Pulmonary effort is " normal.   Abdominal:      Tenderness: There is no right CVA tenderness or left CVA tenderness.       Lab Results/POC Test Results   Results for orders placed or performed in visit on 03/03/25   POCT Urinalysis    Collection Time: 03/03/25 12:41 PM   Result Value Ref Range    POC Color yellow Negative    POC Appearance clear Negative    POC Glucose negative Negative mg/dL    POC Bilirubin negative Negative mg/dL    POC Ketones 15 Negative mg/dL    POC Specific Gravity 1.010 <1.005 - >1.030    POC Blood negative Negative    POC Urine PH 6.0 5.0 - 8.0    POC Protein negative Negative mg/dL    POC Urobiligen 0.2 Negative (0.2) mg/dL    POC Nitrites negative Negative    POC Leukocyte Esterase small Negative             Assessment/Plan:     Diagnosis and associated orders:     1. Dysuria  POCT Urinalysis    URINE CULTURE(NEW)    cephALEXin (KEFLEX) 500 MG Cap         Comments/MDM:     Patient symptomology consistent with acute cystitis.  POCT urinalysis shows some signs of infection.  Patient was started on course of Keflex which she is agreeable to.  Urine culture for further evaluation.  I do not suspect pyelonephritis.  No signs of acute abdomen.  No blood currently in her urine.  Presentation not consistent with kidney stones.  Return precautions given.         Differential diagnosis, natural history, supportive care, and indications for immediate follow-up discussed.    Advised the patient to follow-up with the primary care physician for recheck, reevaluation, and consideration of further management.    Please note that this dictation was created using voice recognition software. I have made a reasonable attempt to correct obvious errors, but I expect that there are errors of grammar and possibly content that I did not discover before finalizing the note.    Electronically signed by Marvin Mercado PA-C.

## 2025-03-06 LAB
BACTERIA UR CULT: NORMAL
SIGNIFICANT IND 70042: NORMAL
SITE SITE: NORMAL
SOURCE SOURCE: NORMAL

## 2025-03-18 ENCOUNTER — OFFICE VISIT (OUTPATIENT)
Dept: URGENT CARE | Facility: CLINIC | Age: 65
End: 2025-03-18
Payer: MEDICARE

## 2025-03-18 VITALS
WEIGHT: 155 LBS | RESPIRATION RATE: 16 BRPM | HEART RATE: 105 BPM | SYSTOLIC BLOOD PRESSURE: 154 MMHG | OXYGEN SATURATION: 98 % | HEIGHT: 67 IN | DIASTOLIC BLOOD PRESSURE: 96 MMHG | BODY MASS INDEX: 24.33 KG/M2 | TEMPERATURE: 97.4 F

## 2025-03-18 DIAGNOSIS — R39.9 UTI SYMPTOMS: ICD-10-CM

## 2025-03-18 DIAGNOSIS — R03.0 ELEVATED BLOOD PRESSURE READING WITHOUT DIAGNOSIS OF HYPERTENSION: ICD-10-CM

## 2025-03-18 DIAGNOSIS — N30.90 CYSTITIS: ICD-10-CM

## 2025-03-18 LAB
APPEARANCE UR: NORMAL
BILIRUB UR STRIP-MCNC: NORMAL MG/DL
COLOR UR AUTO: NORMAL
GLUCOSE UR STRIP.AUTO-MCNC: NEGATIVE MG/DL
KETONES UR STRIP.AUTO-MCNC: NORMAL MG/DL
LEUKOCYTE ESTERASE UR QL STRIP.AUTO: NORMAL
NITRITE UR QL STRIP.AUTO: NEGATIVE
PH UR STRIP.AUTO: 5.5 [PH] (ref 5–8)
PROT UR QL STRIP: NORMAL MG/DL
RBC UR QL AUTO: NORMAL
SP GR UR STRIP.AUTO: 1.02
UROBILINOGEN UR STRIP-MCNC: NORMAL MG/DL

## 2025-03-18 PROCEDURE — 3080F DIAST BP >= 90 MM HG: CPT

## 2025-03-18 PROCEDURE — 3077F SYST BP >= 140 MM HG: CPT

## 2025-03-18 PROCEDURE — 81002 URINALYSIS NONAUTO W/O SCOPE: CPT

## 2025-03-18 PROCEDURE — 99214 OFFICE O/P EST MOD 30 MIN: CPT

## 2025-03-18 RX ORDER — NITROFURANTOIN 25; 75 MG/1; MG/1
100 CAPSULE ORAL 2 TIMES DAILY
Qty: 10 CAPSULE | Refills: 0 | Status: SHIPPED | OUTPATIENT
Start: 2025-03-18 | End: 2025-03-23

## 2025-03-18 RX ORDER — LOSARTAN POTASSIUM 25 MG/1
25 TABLET ORAL DAILY
Qty: 90 TABLET | Refills: 0 | Status: SHIPPED | OUTPATIENT
Start: 2025-03-18 | End: 2026-04-22

## 2025-03-18 RX ORDER — ESTRADIOL 0.1 MG/G
CREAM VAGINAL
Qty: 42.5 G | Refills: 1 | Status: SHIPPED | OUTPATIENT
Start: 2025-03-18

## 2025-03-18 ASSESSMENT — ENCOUNTER SYMPTOMS
NAUSEA: 0
ABDOMINAL PAIN: 0
BACK PAIN: 0
VOMITING: 0
FEVER: 0

## 2025-03-18 ASSESSMENT — FIBROSIS 4 INDEX: FIB4 SCORE: 2.24

## 2025-03-18 NOTE — PROGRESS NOTES
Chief Complaint   Patient presents with    UTI     X 1 day/ pt states a reoccurring issue / Blood in urine       HISTORY OF PRESENT ILLNESS: Patient is a pleasant 64 y.o. female who presents to urgent care today ongoing blood in the urine.  Patient was seen approximately 3 weeks ago with similar issues.  She had a culture done at this time which was negative.  She continue to take the antibiotics despite this.  She believes she may have another recurring UTI today.  She denies any major pain.    Patient Active Problem List    Diagnosis Date Noted    Arthritis of right knee 01/25/2024    Acute conjunctivitis of right eye 08/10/2023    Elevated systolic blood pressure reading with diagnosis of hypertension 08/10/2023    Recurrent UTI 08/10/2023    Vaginal atrophy 08/10/2023       Allergies:Patient has no known allergies.    Current Outpatient Medications Ordered in Epic   Medication Sig Dispense Refill    estradiol (ESTRACE) 0.1 MG/GM vaginal cream 10 mcg application daily x 2 weeks.  Then apply Monday, Wednesday and Friday indefinitely for maintenance. 42.5 g 1    losartan (COZAAR) 25 MG Tab Take 1 Tablet by mouth every day. 90 Tablet 0    nitrofurantoin (MACROBID) 100 MG Cap Take 1 Capsule by mouth 2 times a day for 5 days. 10 Capsule 0    docusate sodium (COLACE) 100 MG Cap Take 1 Capsule by mouth 3 times a day as needed for Constipation. 90 Capsule 0    ondansetron (ZOFRAN ODT) 4 MG TABLET DISPERSIBLE Take 1 Tablet by mouth every 6 hours as needed for Nausea/Vomiting. 20 Tablet 0    Fluticasone Propionate (FLONASE NA) Administer  into affected nostril(S).      loratadine (CLARITIN) 10 MG Tab Take  by mouth.       No current Epic-ordered facility-administered medications on file.       Past Medical History:   Diagnosis Date    Seasonal allergies        Social History     Tobacco Use    Smoking status: Never     Passive exposure: Never    Smokeless tobacco: Never   Vaping Use    Vaping status: Never Used   Substance  "Use Topics    Alcohol use: Yes     Alcohol/week: 12.0 oz     Types: 20 Glasses of wine per week    Drug use: Never       Family Status   Relation Name Status    Mo  Alive    Fa      Sis  Alive    Bro  Alive   No partnership data on file     Family History   Problem Relation Age of Onset    No Known Problems Mother     Cancer Father         esophageal    Throat Cancer Father     No Known Problems Sister     No Known Problems Brother        Review of Systems   Constitutional:  Negative for fever.   Gastrointestinal:  Negative for abdominal pain, nausea and vomiting.   Genitourinary:  Positive for hematuria. Negative for dysuria, frequency and urgency.   Musculoskeletal:  Negative for back pain.       Exam:  BP (!) 154/96   Pulse (!) 105   Temp 36.3 °C (97.4 °F) (Temporal)   Resp 16   Ht 1.702 m (5' 7\")   Wt 70.3 kg (155 lb)   SpO2 98%   Physical Exam  Constitutional:       Appearance: Normal appearance. She is normal weight. She is not toxic-appearing.   HENT:      Head: Normocephalic.      Mouth/Throat:      Mouth: Mucous membranes are moist.      Pharynx: Oropharynx is clear.   Eyes:      Extraocular Movements: Extraocular movements intact.      Conjunctiva/sclera: Conjunctivae normal.      Pupils: Pupils are equal, round, and reactive to light.   Cardiovascular:      Rate and Rhythm: Normal rate and regular rhythm.      Pulses: Normal pulses.      Heart sounds: Normal heart sounds. No murmur heard.  Pulmonary:      Effort: Pulmonary effort is normal. No respiratory distress.      Breath sounds: Normal breath sounds.   Abdominal:      General: Abdomen is flat. Bowel sounds are normal. There is no distension.      Palpations: Abdomen is soft. There is no mass.      Tenderness: There is no abdominal tenderness. There is no right CVA tenderness, left CVA tenderness, guarding or rebound.   Musculoskeletal:         General: Normal range of motion.      Right lower leg: No edema.      Left lower leg: No " edema.   Skin:     General: Skin is warm and dry.      Capillary Refill: Capillary refill takes less than 2 seconds.      Findings: No rash.   Neurological:      General: No focal deficit present.      Mental Status: She is alert.   Psychiatric:         Mood and Affect: Mood normal.         Behavior: Behavior normal.             Assessment/Plan:  1. Cystitis  - nitrofurantoin (MACROBID) 100 MG Cap; Take 1 Capsule by mouth 2 times a day for 5 days.  Dispense: 10 Capsule; Refill: 0    2. UTI symptoms  - POCT Urinalysis  - estradiol (ESTRACE) 0.1 MG/GM vaginal cream; 10 mcg application daily x 2 weeks.  Then apply Monday, Wednesday and Friday indefinitely for maintenance.  Dispense: 42.5 g; Refill: 1    3. Elevated blood pressure reading without diagnosis of hypertension  - losartan (COZAAR) 25 MG Tab; Take 1 Tablet by mouth every day.  Dispense: 90 Tablet; Refill: 0    Based on physical exam along with review of systems I do not think the patient has a UTI, she does appear very dehydrated, patient admits to eating a high salt diet and drinking some beer last night with her [employer] day celebration.  I do believe this is likely a contributing factor.  Will go ahead and treat for now and send for culture, patient advised that if her culture is negative please stop the antibiotics, she does have access to her MyChart.  Patient states ongoing pain with intercourse and on going vaginal dryness, this certainly could be a contributing factor, I did go ahead and provide her with estradiol cream, I did speak with her at length regarding risks involved in addition to benefits.  Patient is aware and agreeable.    Patient has noted elevated hypertension today here in the office.  During her last visit it is noted that it was elevated then as well.  Patient states she checks her blood pressure at home regularly and notes it is slightly elevated.  They are not currently on medications for this.  Given her ongoing elevated  blood pressure we will start on low-dose losartan, patient educated to continue a log.  Education provided on uncontrolled hypertension as well as the need to keep a log.  Referral was placed for primary care, patient advised to follow-up with them and present to the log for ongoing management.  Patient currently denies any symptoms associated with hypertension.    Supportive care, differential diagnoses, and indications for immediate follow-up discussed with patient.   Pathogenesis of diagnosis discussed including typical length and natural progression.   Instructed to return to clinic or nearest emergency department for any change in condition, further concerns, or worsening of symptoms.  Patient states understanding of the plan of care and discharge instructions.  Instructed to make an appointment, for follow up, with primary care provider.    Please note that this dictation was created using voice recognition software. I have made every reasonable attempt to correct obvious errors, but I expect that there are errors of grammar and possibly content that I did not discover before finalizing the note.      Brittney CM

## 2025-03-20 ENCOUNTER — HOSPITAL ENCOUNTER (OUTPATIENT)
Dept: RADIOLOGY | Facility: MEDICAL CENTER | Age: 65
End: 2025-03-20
Payer: COMMERCIAL

## 2025-03-20 DIAGNOSIS — Z12.31 VISIT FOR SCREENING MAMMOGRAM: ICD-10-CM

## 2025-03-20 PROCEDURE — 77067 SCR MAMMO BI INCL CAD: CPT

## 2025-03-27 ENCOUNTER — RESULTS FOLLOW-UP (OUTPATIENT)
Dept: MEDICAL GROUP | Facility: PHYSICIAN GROUP | Age: 65
End: 2025-03-27
Payer: COMMERCIAL

## 2025-04-01 ENCOUNTER — HOSPITAL ENCOUNTER (OUTPATIENT)
Dept: RADIOLOGY | Facility: MEDICAL CENTER | Age: 65
End: 2025-04-01
Payer: MEDICARE

## 2025-04-05 ENCOUNTER — OFFICE VISIT (OUTPATIENT)
Dept: URGENT CARE | Facility: CLINIC | Age: 65
End: 2025-04-05
Payer: MEDICARE

## 2025-04-05 ENCOUNTER — HOSPITAL ENCOUNTER (OUTPATIENT)
Facility: MEDICAL CENTER | Age: 65
End: 2025-04-05
Attending: PHYSICIAN ASSISTANT
Payer: COMMERCIAL

## 2025-04-05 VITALS
DIASTOLIC BLOOD PRESSURE: 80 MMHG | RESPIRATION RATE: 14 BRPM | BODY MASS INDEX: 24.04 KG/M2 | HEART RATE: 83 BPM | TEMPERATURE: 98.1 F | WEIGHT: 158.6 LBS | OXYGEN SATURATION: 97 % | SYSTOLIC BLOOD PRESSURE: 148 MMHG | HEIGHT: 68 IN

## 2025-04-05 DIAGNOSIS — R31.9 HEMATURIA, UNSPECIFIED TYPE: ICD-10-CM

## 2025-04-05 DIAGNOSIS — R35.0 URINARY FREQUENCY: ICD-10-CM

## 2025-04-05 DIAGNOSIS — R31.9 URINARY TRACT INFECTION WITH HEMATURIA, SITE UNSPECIFIED: ICD-10-CM

## 2025-04-05 DIAGNOSIS — N39.0 URINARY TRACT INFECTION WITH HEMATURIA, SITE UNSPECIFIED: ICD-10-CM

## 2025-04-05 DIAGNOSIS — N39.0 FREQUENT UTI: ICD-10-CM

## 2025-04-05 LAB
APPEARANCE UR: NORMAL
BILIRUB UR STRIP-MCNC: NEGATIVE MG/DL
COLOR UR AUTO: NORMAL
GLUCOSE UR STRIP.AUTO-MCNC: NEGATIVE MG/DL
KETONES UR STRIP.AUTO-MCNC: NEGATIVE MG/DL
LEUKOCYTE ESTERASE UR QL STRIP.AUTO: NORMAL
NITRITE UR QL STRIP.AUTO: NEGATIVE
PH UR STRIP.AUTO: 6 [PH] (ref 5–8)
PROT UR QL STRIP: 100 MG/DL
RBC UR QL AUTO: NORMAL
SP GR UR STRIP.AUTO: 1
UROBILINOGEN UR STRIP-MCNC: 0.2 MG/DL

## 2025-04-05 PROCEDURE — 3077F SYST BP >= 140 MM HG: CPT | Performed by: PHYSICIAN ASSISTANT

## 2025-04-05 PROCEDURE — 99213 OFFICE O/P EST LOW 20 MIN: CPT | Performed by: PHYSICIAN ASSISTANT

## 2025-04-05 PROCEDURE — 87086 URINE CULTURE/COLONY COUNT: CPT

## 2025-04-05 PROCEDURE — 3079F DIAST BP 80-89 MM HG: CPT | Performed by: PHYSICIAN ASSISTANT

## 2025-04-05 PROCEDURE — 81002 URINALYSIS NONAUTO W/O SCOPE: CPT | Performed by: PHYSICIAN ASSISTANT

## 2025-04-05 RX ORDER — CEFDINIR 300 MG/1
300 CAPSULE ORAL 2 TIMES DAILY
Qty: 14 CAPSULE | Refills: 0 | Status: SHIPPED | OUTPATIENT
Start: 2025-04-05 | End: 2025-04-09

## 2025-04-05 ASSESSMENT — ENCOUNTER SYMPTOMS
ABDOMINAL PAIN: 0
EYE REDNESS: 0
FLANK PAIN: 0
VOMITING: 0
FEVER: 0
EYE DISCHARGE: 0
NAUSEA: 0

## 2025-04-05 ASSESSMENT — FIBROSIS 4 INDEX: FIB4 SCORE: 2.24

## 2025-04-05 NOTE — PROGRESS NOTES
Subjective     Sandhya Christine is a 64 y.o. female who presents with UTI (Started having blood in her urine 3 days ago, was here on 03/18 and was not experiencing this sx then )            UTI  This is a new problem. Episode onset: x 3 days ago. The problem has been unchanged. Associated symptoms include urinary symptoms (The patient reports associated blood in her urine. The patient states typically the blood improves with increased hydration, but notes today the blood has persisted. The patient is also experiencing urinary frequency. She reports no dysuria. No flank pain.). Pertinent negatives include no abdominal pain, fever, nausea or vomiting. She has tried nothing for the symptoms.     The patient was recently seen in clinic on 3/18/2025 for UTI like symptoms with blood in her urine.  The patient states she was prescribed antibiotics at that time with improvement of her symptoms.  The patient developed recurrent symptoms x 3 days ago.  The patient states she has increased her fluid intake over the past several weeks.    PMH:  has a past medical history of Seasonal allergies.  MEDS:   Current Outpatient Medications:     estradiol (ESTRACE) 0.1 MG/GM vaginal cream, 10 mcg application daily x 2 weeks.  Then apply Monday, Wednesday and Friday indefinitely for maintenance., Disp: 42.5 g, Rfl: 1    losartan (COZAAR) 25 MG Tab, Take 1 Tablet by mouth every day., Disp: 90 Tablet, Rfl: 0    Fluticasone Propionate (FLONASE NA), Administer  into affected nostril(S)., Disp: , Rfl:     loratadine (CLARITIN) 10 MG Tab, Take  by mouth., Disp: , Rfl:     docusate sodium (COLACE) 100 MG Cap, Take 1 Capsule by mouth 3 times a day as needed for Constipation., Disp: 90 Capsule, Rfl: 0    ondansetron (ZOFRAN ODT) 4 MG TABLET DISPERSIBLE, Take 1 Tablet by mouth every 6 hours as needed for Nausea/Vomiting., Disp: 20 Tablet, Rfl: 0  ALLERGIES: No Known Allergies  SURGHX:   Past Surgical History:   Procedure Laterality Date     "PB TOTAL KNEE ARTHROPLASTY Right 5/1/2024    Procedure: RIGHT TOTAL KNEE ARTHROPLASTY;  Surgeon: Juno Templeton M.D.;  Location: [facility];  Service: Orthopedics    ORIF, SHOULDER Right      SOCHX:  reports that she has never smoked. She has never been exposed to tobacco smoke. She has never used smokeless tobacco. She reports current alcohol use of about 12.0 oz of alcohol per week. She reports that she does not use drugs.  FH: Family history was reviewed, no pertinent findings to report    Review of Systems   Constitutional:  Negative for fever.   Eyes:  Negative for discharge and redness.   Gastrointestinal:  Negative for abdominal pain, nausea and vomiting.   Genitourinary:  Positive for frequency and hematuria. Negative for dysuria, flank pain and urgency.              Objective     BP (!) 148/80 (BP Location: Left arm, Patient Position: Sitting, BP Cuff Size: Adult)   Pulse 83   Temp 36.7 °C (98.1 °F) (Temporal)   Resp 14   Ht 1.727 m (5' 8\")   Wt 71.9 kg (158 lb 9.6 oz)   SpO2 97%   BMI 24.12 kg/m²      Physical Exam  Constitutional:       General: She is not in acute distress.     Appearance: Normal appearance. She is well-developed. She is not ill-appearing.   HENT:      Head: Normocephalic and atraumatic.      Right Ear: External ear normal.      Left Ear: External ear normal.   Eyes:      Extraocular Movements: Extraocular movements intact.      Conjunctiva/sclera: Conjunctivae normal.   Cardiovascular:      Rate and Rhythm: Normal rate and regular rhythm.      Heart sounds: Normal heart sounds.   Pulmonary:      Effort: Pulmonary effort is normal. No respiratory distress.      Breath sounds: Normal breath sounds. No wheezing.   Abdominal:      Palpations: Abdomen is soft.      Tenderness: There is no abdominal tenderness. There is no right CVA tenderness or left CVA tenderness.   Musculoskeletal:         General: Normal range of motion.      Cervical back: Normal range of " motion and neck supple.   Skin:     General: Skin is warm and dry.   Neurological:      Mental Status: She is alert and oriented to person, place, and time.               Progress:  Results for orders placed or performed in visit on 04/05/25   POCT Urinalysis    Collection Time: 04/05/25 11:01 AM   Result Value Ref Range    POC Color pink Negative    POC Appearance cloudy Negative    POC Glucose negative Negative mg/dL    POC Bilirubin negative Negative mg/dL    POC Ketones negative Negative mg/dL    POC Specific Gravity 1.005 <1.005 - >1.030    POC Blood large Negative    POC Urine PH 6.0 5.0 - 8.0    POC Protein 100 Negative mg/dL    POC Urobiligen 0.2 Negative (0.2) mg/dL    POC Nitrites negative Negative    POC Leukocyte Esterase small Negative       Urine Culture - pending                   Assessment & Plan  Urinary frequency    Orders:    POCT Urinalysis    URINE CULTURE(NEW); Future    Hematuria, unspecified type    Orders:    POCT Urinalysis    URINE CULTURE(NEW); Future    Urinary tract infection with hematuria, site unspecified    Orders:    cefdinir (OMNICEF) 300 MG Cap; Take 1 Capsule by mouth 2 times a day for 7 days.    Frequent UTI    Orders:    Referral to Urology           The patient's presenting symptoms and physical exam findings are consistent with urinary frequency and hematuria likely secondary to an acute urinary tract infection.  The patient has been experiencing frequent UTIs over the past month with this being her third urgent care visit since 3/3/2025.  The patient's physical exam today in clinic was normal.  The patient is nontoxic and appears in no acute distress.  Patient's vital signs are stable and within normal limits.  She is afebrile today in clinic.  The patient's POCT urinalysis today in clinic showed small leukocyte esterase and large blood with negative nitrates.  Will culture the patient's urine to identify a likely bacterial source.  Will prescribe the patient cefdinir for  presumed urinary tract infection.  Will also place a referral to urology for further evaluation and management.  Advised the patient to monitor for worsening signs or symptoms.  Recommend OTC medications and supportive care for symptomatic management.  Recommend patient follow-up with primary care.  Discussed return precautions with the patient, and she verbalized understanding.    Differential diagnoses, supportive care, and indications for immediate follow-up discussed with patient.   Instructed to return to clinic or nearest emergency department for any change in condition, further concerns, or worsening of symptoms.    OTC Tylenol or Motrin for fever/discomfort.  Drink plenty of fluids  Follow-up with PCP  Return to clinic or go to the ED if symptoms worsen or fail to improve, or if the patient should develop worsening/increasing urinary symptoms, hematuria, flank pain, abdominal pain, nausea/vomiting, fever/chills, and/or any concerning symptoms.    Discussed plan with the patient, and she agrees to the above.     I personally reviewed prior external notes and test results pertinent to today's visit.  I have independently reviewed and interpreted all diagnostics ordered during this urgent care visit.     Please note that this dictation was created using voice recognition software. I have made every reasonable attempt to correct obvious errors, but I expect that there may be errors of grammar and possibly content that I did not discover before finalizing the note.     This note was electronically signed by Nikki Sims PA-C

## 2025-04-07 ENCOUNTER — RESULTS FOLLOW-UP (OUTPATIENT)
Dept: URGENT CARE | Facility: CLINIC | Age: 65
End: 2025-04-07
Payer: MEDICARE

## 2025-04-07 LAB
BACTERIA UR CULT: NORMAL
SIGNIFICANT IND 70042: NORMAL
SITE SITE: NORMAL
SOURCE SOURCE: NORMAL

## 2025-04-08 NOTE — Clinical Note
REFERRAL APPROVAL NOTICE         Sent on April 8, 2025                   Sandhya Taylorjaylene  [address]  [place] [address]                   Dear Ms. Christine,    After a careful review of the medical information and benefit coverage, [facility] has processed your referral. See below for additional details.    If applicable, you must be actively enrolled with your insurance for coverage of the authorized service. If you have any questions regarding your coverage, please contact your insurance directly.    REFERRAL INFORMATION   Referral #:  [id number]  Referred-To Department    Referred-By Provider:  Urology    Nikki Sims P.A.-C.   [facility]      [address]  [place] [id number] [phone] [address]  [place] [id number] [phone]    Referral Start Date:  04/05/2025  Referral End Date:   04/05/2026             SCHEDULING  If you do not already have an appointment, please call [phone] to make an appointment.     MORE INFORMATION  If you do not already have a [facility] account, sign up at: [email or web address]  You can access your medical information, make appointments, see lab results, billing information, and more.  If you have questions regarding this referral, please contact  the [facility] Referrals department at:             [phone]. Monday - Friday 8:00AM - 5:00PM.     Sincerely,    [facility]

## 2025-04-09 ENCOUNTER — OFFICE VISIT (OUTPATIENT)
Dept: MEDICAL GROUP | Facility: PHYSICIAN GROUP | Age: 65
End: 2025-04-09
Payer: MEDICARE

## 2025-04-09 ENCOUNTER — HOSPITAL ENCOUNTER (OUTPATIENT)
Facility: MEDICAL CENTER | Age: 65
End: 2025-04-09
Payer: MEDICARE

## 2025-04-09 VITALS
SYSTOLIC BLOOD PRESSURE: 138 MMHG | TEMPERATURE: 97.5 F | HEART RATE: 85 BPM | DIASTOLIC BLOOD PRESSURE: 80 MMHG | OXYGEN SATURATION: 98 % | WEIGHT: 159.8 LBS | BODY MASS INDEX: 24.22 KG/M2 | HEIGHT: 68 IN

## 2025-04-09 DIAGNOSIS — N89.8 VAGINAL IRRITATION: ICD-10-CM

## 2025-04-09 DIAGNOSIS — E78.5 DYSLIPIDEMIA: ICD-10-CM

## 2025-04-09 DIAGNOSIS — R73.01 IFG (IMPAIRED FASTING GLUCOSE): ICD-10-CM

## 2025-04-09 DIAGNOSIS — N39.0 RECURRENT UTI: ICD-10-CM

## 2025-04-09 DIAGNOSIS — I10 PRIMARY HYPERTENSION: ICD-10-CM

## 2025-04-09 LAB
APPEARANCE UR: NORMAL
BILIRUB UR STRIP-MCNC: NORMAL MG/DL
COLOR UR AUTO: NORMAL
GLUCOSE UR STRIP.AUTO-MCNC: NORMAL MG/DL
KETONES UR STRIP.AUTO-MCNC: NORMAL MG/DL
LEUKOCYTE ESTERASE UR QL STRIP.AUTO: NORMAL
NITRITE UR QL STRIP.AUTO: NORMAL
PH UR STRIP.AUTO: 7 [PH] (ref 5–8)
PROT UR QL STRIP: NORMAL MG/DL
RBC UR QL AUTO: NORMAL
SP GR UR STRIP.AUTO: 1.01
UROBILINOGEN UR STRIP-MCNC: 0.2 MG/DL

## 2025-04-09 PROCEDURE — 87660 TRICHOMONAS VAGIN DIR PROBE: CPT

## 2025-04-09 PROCEDURE — 99214 OFFICE O/P EST MOD 30 MIN: CPT

## 2025-04-09 PROCEDURE — 87510 GARDNER VAG DNA DIR PROBE: CPT

## 2025-04-09 PROCEDURE — 87480 CANDIDA DNA DIR PROBE: CPT

## 2025-04-09 PROCEDURE — 3079F DIAST BP 80-89 MM HG: CPT

## 2025-04-09 PROCEDURE — 81002 URINALYSIS NONAUTO W/O SCOPE: CPT

## 2025-04-09 PROCEDURE — 3075F SYST BP GE 130 - 139MM HG: CPT

## 2025-04-09 RX ORDER — TELMISARTAN 20 MG/1
20 TABLET ORAL DAILY
Qty: 100 TABLET | Refills: 3 | Status: SHIPPED | OUTPATIENT
Start: 2025-04-09 | End: 2025-04-10 | Stop reason: SDUPTHER

## 2025-04-09 ASSESSMENT — ENCOUNTER SYMPTOMS
DIZZINESS: 0
BLURRED VISION: 0
DIARRHEA: 0
WEAKNESS: 0
SHORTNESS OF BREATH: 0
COUGH: 0
HEADACHES: 0
FEVER: 0
CHILLS: 0
ABDOMINAL PAIN: 0
WEIGHT LOSS: 0
VOMITING: 0
CONSTIPATION: 0
NAUSEA: 0
MYALGIAS: 0

## 2025-04-09 ASSESSMENT — PATIENT HEALTH QUESTIONNAIRE - PHQ9: CLINICAL INTERPRETATION OF PHQ2 SCORE: 0

## 2025-04-09 ASSESSMENT — FIBROSIS 4 INDEX: FIB4 SCORE: 2.24

## 2025-04-10 ENCOUNTER — RESULTS FOLLOW-UP (OUTPATIENT)
Dept: MEDICAL GROUP | Facility: PHYSICIAN GROUP | Age: 65
End: 2025-04-10

## 2025-04-10 DIAGNOSIS — I10 PRIMARY HYPERTENSION: ICD-10-CM

## 2025-04-10 LAB
CANDIDA DNA VAG QL PROBE+SIG AMP: NEGATIVE
G VAGINALIS DNA VAG QL PROBE+SIG AMP: NEGATIVE
T VAGINALIS DNA VAG QL PROBE+SIG AMP: NEGATIVE

## 2025-04-10 RX ORDER — TELMISARTAN 20 MG/1
20 TABLET ORAL DAILY
Qty: 100 TABLET | Refills: 3 | Status: SHIPPED | OUTPATIENT
Start: 2025-04-10 | End: 2026-05-15

## 2025-04-10 NOTE — TELEPHONE ENCOUNTER
Received request via: Pharmacy    Was the patient seen in the last year in this department? Yes    Does the patient have an active prescription (recently filled or refills available) for medication(s) requested? No    Pharm[facility]    Does the patient have long-term Plus and need 100-day supply? (This applies to ALL medications) Patient does not have SCP

## 2025-04-10 NOTE — PROGRESS NOTES
Verbal consent was acquired by the patient to use [employer] ambient listening note generation during this visit  Subjective:     CC:  Diagnoses of Recurrent UTI, Primary hypertension, Dyslipidemia, IFG (impaired fasting glucose), and Vaginal irritation were pertinent to this visit.    HISTORY OF THE PRESENT ILLNESS: Patient is a 64 y.o. female.   No problems updated.    History of Present Illness  The patient presents for evaluation of urinary incontinence, hematuria, and hypertension.    Urinary Incontinence  - She reports experiencing urinary frequency, necessitating micturition approximately four times daily.  - Additionally, she describes intermittent urinary incontinence, characterized by urgency following micturition.  - The patient has a history of childbirth.  - She has an upcoming appointment with a urologist on 04/16/2025, following a referral from Urgent Care.  - She has been using vaginal estrogen cream, initially prescribed for daily application for two weeks, subsequently reduced to a three-day regimen.  - She has no history of candidiasis.    Hematuria  - The patient sought medical attention at Urgent Care due to the presence of hematuria, which was subsequently reported as negative.  - During a subsequent visit to Urgent Care, she was diagnosed with dehydration.  - She has since increased her fluid intake to approximately 80 ounces per day, resulting in the resolution of visible hematuria.  - She discontinued cefdinir due to its adverse effect of diarrhea, despite being advised to continue the medication if the hematuria resolved.  - She reports no vaginal bleeding but notes that the hematuria is only present during urination.    Hypertension  - Her blood pressure was elevated during today's visit, which she attributes to white coat hypertension.  - She has brought a record of her blood pressure readings for review.  - She was previously advised to increase her losartan dosage, but this  "recommendation was not followed due to concerns of potential hypotension.  - Her blood pressure typically measures 140/84 upon waking and decreases to 127/80 after taking losartan.  - She reports no associated chest pain, dyspnea, or cephalalgia.  - She experienced mild dizziness a few days ago, which she believes may have been due to excessive caffeine consumption.  - She also reports a blood pressure reading of 107/60 one day after gym attendance, having taken her medication in the morning and attended the gym in the afternoon.    MEDICATIONS  Discontinued: Ondansetron, docusate, cefdinir    ROS:   Review of Systems   Constitutional:  Negative for chills, fever, malaise/fatigue and weight loss.   Eyes:  Negative for blurred vision.   Respiratory:  Negative for cough and shortness of breath.    Cardiovascular:  Negative for chest pain.   Gastrointestinal:  Negative for abdominal pain, constipation, diarrhea, nausea and vomiting.   Genitourinary:  Positive for urgency. Negative for dysuria.   Musculoskeletal:  Negative for myalgias.   Neurological:  Negative for dizziness, weakness and headaches.         Objective:     Exam: /80 (BP Location: Left arm, Patient Position: Sitting, BP Cuff Size: Adult)   Pulse 85   Temp 36.4 °C (97.5 °F) (Temporal)   Ht 1.727 m (5' 8\")   Wt 72.5 kg (159 lb 12.8 oz)   SpO2 98%  Body mass index is 24.3 kg/m².    Physical Exam      Labs:     Assessment & Plan: Medical Decision Making   64 y.o. female with the following -    Assessment & Plan  1. Urinary incontinence.  Her urinary incontinence may be attributed to a weakened pelvic floor, potentially exacerbated by childbirth. The possibility of bacterial vaginitis or yeast infection was also considered. A vaginal swab will be obtained for culture to rule out bacterial vaginitis or yeast infection. She is advised to discuss her incontinence with Dr. Childs during her upcoming appointment. Pelvic floor physical therapy and the " continuation of use of vaginal estrogen 1% 2-3 times weekly  were recommended to strengthen the pelvic floor and restore pH balance. She was instructed on the proper application of vaginal estrogen cream.    2. Hematuria.  She reported visible blood in her urine, which has since disappeared. Previous urine tests showed a large amount of blood and small amounts of leukoesterase, but cultures were negative. A urine culture will be sent for further analysis. If hematuria persists despite negative UTI results, a referral to a urologist will be considered.    3. Elevated blood pressure.  Her blood pressure readings have been sporadic, with occasional dizziness potentially linked to high coffee intake. She will be transitioned from losartan to telmisartan 20 mg daily for better blood pressure control. She was advised to monitor her blood pressure and report any significant changes.    4. Health maintenance.  Annual labs will be ordered to monitor her overall health status.      Problem List Items Addressed This Visit       Recurrent UTI    Relevant Orders    POCT Urinalysis (Completed)    URINALYSIS,CULTURE IF INDICATED     Other Visit Diagnoses         Primary hypertension        Relevant Medications    telmisartan (MICARDIS) 20 MG tablet    Other Relevant Orders    Comp Metabolic Panel      Dyslipidemia        Relevant Orders    Comp Metabolic Panel    Lipid Profile      IFG (impaired fasting glucose)        Relevant Orders    HEMOGLOBIN A1C      Vaginal irritation        Relevant Orders    VAGINAL PATHOGENS DNA PANEL            Differential diagnosis, natural history, supportive care, and indications for immediate follow-up discussed.  Shared decision making approach was utilized, and patient is amendable with plan of care.  Patient understands to return to clinic or go to the emergency department if symptoms worsen. All questions and concerns addressed to the best of my knowledge.      Return in about 6 months (around  10/9/2025), or if symptoms worsen or fail to improve.    Please note that this dictation was created using voice recognition software. I have made every reasonable attempt to correct obvious errors, but I expect that there are errors of grammar and possibly content that I did not discover before finalizing the note.

## 2025-04-11 ENCOUNTER — TELEPHONE (OUTPATIENT)
Dept: MEDICAL GROUP | Facility: PHYSICIAN GROUP | Age: 65
End: 2025-04-11
Payer: MEDICARE

## 2025-04-11 NOTE — TELEPHONE ENCOUNTER
Kenisha from [facility] left vm stating that they did not receive correct specimen for pt. [phone]

## 2025-04-14 ENCOUNTER — TELEPHONE (OUTPATIENT)
Dept: MEDICAL GROUP | Facility: PHYSICIAN GROUP | Age: 65
End: 2025-04-14
Payer: MEDICARE

## 2025-04-14 ENCOUNTER — HOSPITAL ENCOUNTER (OUTPATIENT)
Dept: LAB | Facility: MEDICAL CENTER | Age: 65
End: 2025-04-14
Payer: MEDICARE

## 2025-04-14 DIAGNOSIS — E78.5 DYSLIPIDEMIA: ICD-10-CM

## 2025-04-14 DIAGNOSIS — I10 PRIMARY HYPERTENSION: ICD-10-CM

## 2025-04-14 DIAGNOSIS — R73.01 IFG (IMPAIRED FASTING GLUCOSE): ICD-10-CM

## 2025-04-14 LAB
ALBUMIN SERPL BCP-MCNC: 4.4 G/DL (ref 3.2–4.9)
ALBUMIN/GLOB SERPL: 1.4 G/DL
ALP SERPL-CCNC: 60 U/L (ref 30–99)
ALT SERPL-CCNC: 31 U/L (ref 2–50)
ANION GAP SERPL CALC-SCNC: 11 MMOL/L (ref 7–16)
AST SERPL-CCNC: 35 U/L (ref 12–45)
BILIRUB SERPL-MCNC: 0.4 MG/DL (ref 0.1–1.5)
BUN SERPL-MCNC: 8 MG/DL (ref 8–22)
CALCIUM ALBUM COR SERPL-MCNC: 9.1 MG/DL (ref 8.5–10.5)
CALCIUM SERPL-MCNC: 9.4 MG/DL (ref 8.5–10.5)
CHLORIDE SERPL-SCNC: 106 MMOL/L (ref 96–112)
CHOLEST SERPL-MCNC: 226 MG/DL (ref 100–199)
CO2 SERPL-SCNC: 25 MMOL/L (ref 20–33)
CREAT SERPL-MCNC: 0.68 MG/DL (ref 0.5–1.4)
EST. AVERAGE GLUCOSE BLD GHB EST-MCNC: 114 MG/DL
FASTING STATUS PATIENT QL REPORTED: NORMAL
GFR SERPLBLD CREATININE-BSD FMLA CKD-EPI: 96 ML/MIN/1.73 M 2
GLOBULIN SER CALC-MCNC: 3.1 G/DL (ref 1.9–3.5)
GLUCOSE SERPL-MCNC: 105 MG/DL (ref 65–99)
HBA1C MFR BLD: 5.6 % (ref 4–5.6)
HDLC SERPL-MCNC: 106 MG/DL
LDLC SERPL CALC-MCNC: 113 MG/DL
POTASSIUM SERPL-SCNC: 4.6 MMOL/L (ref 3.6–5.5)
PROT SERPL-MCNC: 7.5 G/DL (ref 6–8.2)
SODIUM SERPL-SCNC: 142 MMOL/L (ref 135–145)
TRIGL SERPL-MCNC: 33 MG/DL (ref 0–149)

## 2025-04-14 PROCEDURE — 80061 LIPID PANEL: CPT

## 2025-04-14 PROCEDURE — 83036 HEMOGLOBIN GLYCOSYLATED A1C: CPT

## 2025-04-14 PROCEDURE — 36415 COLL VENOUS BLD VENIPUNCTURE: CPT

## 2025-04-14 PROCEDURE — 80053 COMPREHEN METABOLIC PANEL: CPT

## 2025-04-15 ENCOUNTER — RESULTS FOLLOW-UP (OUTPATIENT)
Dept: MEDICAL GROUP | Facility: PHYSICIAN GROUP | Age: 65
End: 2025-04-15

## 2025-04-15 ENCOUNTER — APPOINTMENT (OUTPATIENT)
Dept: MEDICAL GROUP | Facility: PHYSICIAN GROUP | Age: 65
End: 2025-04-15
Payer: MEDICARE

## 2025-04-17 ENCOUNTER — HOSPITAL ENCOUNTER (OUTPATIENT)
Facility: MEDICAL CENTER | Age: 65
End: 2025-04-17
Payer: MEDICARE

## 2025-04-17 ENCOUNTER — OFFICE VISIT (OUTPATIENT)
Dept: UROLOGY | Facility: MEDICAL CENTER | Age: 65
End: 2025-04-17
Payer: COMMERCIAL

## 2025-04-17 ENCOUNTER — TELEPHONE (OUTPATIENT)
Dept: UROLOGY | Facility: MEDICAL CENTER | Age: 65
End: 2025-04-17

## 2025-04-17 DIAGNOSIS — R31.0 GROSS HEMATURIA: ICD-10-CM

## 2025-04-17 DIAGNOSIS — N39.0 RECURRENT UTI: ICD-10-CM

## 2025-04-17 LAB
APPEARANCE UR: CLEAR
APPEARANCE UR: CLEAR
BACTERIA #/AREA URNS HPF: ABNORMAL /HPF
BILIRUB UR QL STRIP.AUTO: NEGATIVE
BILIRUB UR STRIP-MCNC: NORMAL MG/DL
CASTS URNS QL MICRO: ABNORMAL /LPF (ref 0–2)
COLOR UR AUTO: NORMAL
COLOR UR: YELLOW
EPITHELIAL CELLS 1715: ABNORMAL /HPF (ref 0–5)
GLUCOSE UR STRIP.AUTO-MCNC: NEGATIVE MG/DL
GLUCOSE UR STRIP.AUTO-MCNC: NORMAL MG/DL
KETONES UR STRIP.AUTO-MCNC: NEGATIVE MG/DL
KETONES UR STRIP.AUTO-MCNC: NORMAL MG/DL
LEUKOCYTE ESTERASE UR QL STRIP.AUTO: ABNORMAL
LEUKOCYTE ESTERASE UR QL STRIP.AUTO: NORMAL
MICRO URNS: ABNORMAL
NITRITE UR QL STRIP.AUTO: NEGATIVE
NITRITE UR QL STRIP.AUTO: NORMAL
PH UR STRIP.AUTO: 6 [PH] (ref 5–8)
PH UR STRIP.AUTO: 6.5 [PH] (ref 5–8)
POC POST-VOID: 83 ML
POC PRE-VOID: NORMAL
PROT UR QL STRIP: NEGATIVE MG/DL
PROT UR QL STRIP: NORMAL MG/DL
RBC # URNS HPF: ABNORMAL /HPF (ref 0–2)
RBC UR QL AUTO: ABNORMAL
RBC UR QL AUTO: NORMAL
SP GR UR STRIP.AUTO: 1.01
SP GR UR STRIP.AUTO: 1.01
UROBILINOGEN UR STRIP-MCNC: 0.2 MG/DL
UROBILINOGEN UR STRIP.AUTO-MCNC: 0.2 EU/DL
WBC #/AREA URNS HPF: ABNORMAL /HPF

## 2025-04-17 PROCEDURE — 51798 US URINE CAPACITY MEASURE: CPT

## 2025-04-17 PROCEDURE — 99204 OFFICE O/P NEW MOD 45 MIN: CPT

## 2025-04-17 PROCEDURE — 81002 URINALYSIS NONAUTO W/O SCOPE: CPT

## 2025-04-17 PROCEDURE — 81001 URINALYSIS AUTO W/SCOPE: CPT

## 2025-04-17 NOTE — PROGRESS NOTES
Subjective  Sandhya Christine is a 64 y.o. female who presents today for evaluation of recurrent UTIs/gross hematuria. The frequency is when she suspects the UTIs. She reports recurrent UTIs which started 5 years ago, She has tested +  then last few times.     She reports she saw blood in her urine x2 weeks. She was seeing blood in her urine every day. She was treated for UTI with antibiotic and once antibiotics were taken gross hematuria resolved. She then had gross hematuria again, and the blood in her urine did not improve    Urine culture negative on April 5, POCT urinalysis positive for large amounts of blood small leuks negative nitrites    Urine culture negative on April 3, POCT urinalysis negative nitrites positive small leuks negative blood    She was recently seen in primary care on 04/09/5 for urinary incontinence where she reported frequency. She reports stress incontinence.She also reports urge urinary incontinence. She wears a daily. This is not bothersome enough to start a medication.     She report vaginal itching, SP pain- she reports it as hard and reports if she has a BM- it goes away and if she avoids popcorn or nuts.    She denies vaginal dryness, dysparunia (she started vaginal estrogen last month and she is using it 2-3 times per week- the amount the size of a pea)    She was getting up 4x per night when she had the gross hematuria but now only 1 time per night.    She was referred to urology by urgent care for recurrent UTI.    She is not a current or former smoker/no bladder cancer history/nephrolithiasis    Family History   Problem Relation Age of Onset    No Known Problems Mother     Cancer Father         esophageal    Throat Cancer Father     No Known Problems Sister     No Known Problems Brother        Social History     Socioeconomic History    Marital status:      Spouse name: Not on file    Number of children: Not on file    Years of education: Not on file     Highest education level: Not on file   Occupational History    Not on file   Tobacco Use    Smoking status: Never     Passive exposure: Never    Smokeless tobacco: Never   Vaping Use    Vaping status: Never Used   Substance and Sexual Activity    Alcohol use: Yes     Alcohol/week: 12.0 oz     Types: 20 Glasses of wine per week    Drug use: Never    Sexual activity: Not on file   Other Topics Concern    Not on file   Social History Narrative    Not on file     Social Drivers of Health     Financial Resource Strain: Not on file   Food Insecurity: Not on file   Transportation Needs: Not on file   Physical Activity: Not on file   Stress: Not on file   Social Connections: Not on file   Intimate Partner Violence: Not on file   Housing Stability: Not on file       Past Surgical History:   Procedure Laterality Date    PB TOTAL KNEE ARTHROPLASTY Right 5/1/2024    Procedure: RIGHT TOTAL KNEE ARTHROPLASTY;  Surgeon: Juno Templeton M.D.;  Location: [facility];  Service: Orthopedics    ORIF, SHOULDER Right        Past Medical History:   Diagnosis Date    Seasonal allergies        Current Outpatient Medications   Medication Sig Dispense Refill    telmisartan (MICARDIS) 20 MG tablet Take 1 Tablet by mouth every day. 100 Tablet 3    estradiol (ESTRACE) 0.1 MG/GM vaginal cream 10 mcg application daily x 2 weeks.  Then apply Monday, Wednesday and Friday indefinitely for maintenance. 42.5 g 1    Fluticasone Propionate (FLONASE NA) Administer  into affected nostril(S).      loratadine (CLARITIN) 10 MG Tab Take  by mouth.       No current facility-administered medications for this visit.       No Known Allergies    Objective  There were no vitals taken for this visit.  Physical Exam  Genitourinary:     Comments: PVR 83        Labs:   POCT UA   Lab Results   Component Value Date/Time    POCCOLOR light yellow 04/17/2025 02:30 PM    POCAPPEAR clear 04/17/2025 02:30 PM    POCLEUKEST trace 04/17/2025 02:30 PM     POCNITRITE neg 04/17/2025 02:30 PM    POCUROBILIGE 0.2 04/17/2025 02:30 PM    POCPROTEIN neg 04/17/2025 02:30 PM    POCURPH 6.0 04/17/2025 02:30 PM    POCBLOOD trace-lyced 04/17/2025 02:30 PM    POCSPGRV 1.010 04/17/2025 02:30 PM    POCKETONES neg 04/17/2025 02:30 PM    POCBILIRUBIN neg 04/17/2025 02:30 PM    POCGLUCUA neg 04/17/2025 02:30 PM            Imaging:   none    Assessment    For recurrent urinary tract infections: Because his urinalyses are usually negative for nitrites though positive for blood, this is questionable recurrent urinary tract infections with cultures being negative x 2.  She is currently using vaginal estrogen 2 to 3 days/week which she started a month ago.    For gross hematuria: I have ordered a CT urogram, and cystoscopy to be completed.  I have also done a POC urinalysis which was positive for trace lysed blood.  For this I have sent a urinalysis and micro urinalysis    Return to clinic in 2 months    Plan  CT urogram  Cystoscopy  Follow-up in 2 months for recurrent urinary tract infections versus interstitial cystitis.  We need to treat underlying symptoms for interstitial cystitis with Azo, gabapentin, TENS unit, and bladder installations  Problem List Items Addressed This Visit       Recurrent UTI

## 2025-04-17 NOTE — TELEPHONE ENCOUNTER
----- Message from ESVIN HICKS sent at 4/17/2025  3:15 PM PDT -----  Hello scheduled for cysto in 5/14 has medicare part a and b

## 2025-04-17 NOTE — PATIENT INSTRUCTIONS
Cystoscopy - What to Expect     Preparation: Before the procedure, the patient will typically be asked to empty their bladder. No fasting is usually required, but it's a good idea to wear comfortable clothing. The patient may also be given the option of a local anesthetic gel to numb the urethra.     Patient Positioning: The patient will be asked to lie down on an examination table. If you are female you will be asked to position yourself with your feet in stirrups, similar to a gynecological examination. It's essential to ensure the patient is as comfortable as possible. Male patients will stay laying down with their legs on the table.      Sterile Field: You will be undressed from the waist down. The assistant will use betadine or hibiclens to clean the penis or vaginal opening. The healthcare provider will use sterile techniques to maintain a clean environment during the procedure. They will wear gloves and use sterile instruments.     Catheter Insertion: A lubricated cystoscope, which is a thin, flexible tube with a light and camera, is gently inserted into the patient's urethra. Some patients may feel a mild discomfort or pressure during this step.     Visualization: As the cystoscope is advanced through the urethra into the bladder, the healthcare provider will use it to inspect the bladder lining and look for any abnormalities. The patient may be able to see the images on a screen if the procedure is being conducted using a video system.     Biopsy or Treatment: If any abnormalities or concerns are identified during the cystoscopy, the healthcare provider may take a biopsy for further examination or perform any necessary treatments. This could include the removal of bladder stones, tissue samples, or other interventions.     Cystoscopy Completion: Once the examination and any necessary procedures are completed, the cystoscope is gently removed from the bladder, and the procedure is finished.     Recovery:  Most patients can resume their normal activities immediately after the cystoscopy. Some may experience mild discomfort or a burning sensation during urination for a short time afterward, but this usually subsides quickly.     Risks and Benefits:     Benefits:     Diagnostic Tool: Flexible cystoscopy is a valuable diagnostic tool that helps your healthcare provider assess and diagnose various urinary tract conditions, including bladder tumors, urinary tract infections, and urinary incontinence.   Minimally Invasive: The procedure is minimally invasive, meaning it involves a small incision or no incision at all, reducing the risk of complications.   Treatment: In some cases, flexible cystoscopy can be used to treat certain urinary tract conditions, such as removing small bladder stones or benign growths.     Risks:     Discomfort: While the procedure is generally well-tolerated, you may experience some discomfort, mild burning during urination, or a feeling of urgency to urinate immediately after the procedure.   Infection: There is a slight risk of urinary tract infection following the procedure, but this is rare.   Bleeding: Minimal bleeding is possible, particularly if a biopsy is taken during the cystoscopy.   Rare Complications: Serious complications are exceedingly rare but may include injury to the urethra or bladder.

## 2025-04-23 ENCOUNTER — HOSPITAL ENCOUNTER (OUTPATIENT)
Dept: RADIOLOGY | Facility: MEDICAL CENTER | Age: 65
End: 2025-04-23
Payer: MEDICARE

## 2025-04-23 DIAGNOSIS — R92.8 ABNORMAL MAMMOGRAM: ICD-10-CM

## 2025-04-23 PROCEDURE — 76642 ULTRASOUND BREAST LIMITED: CPT | Mod: RT

## 2025-04-23 PROCEDURE — G0279 TOMOSYNTHESIS, MAMMO: HCPCS

## 2025-04-24 ENCOUNTER — APPOINTMENT (OUTPATIENT)
Dept: MEDICAL GROUP | Facility: PHYSICIAN GROUP | Age: 65
End: 2025-04-24
Payer: MEDICARE

## 2025-04-29 ENCOUNTER — HOSPITAL ENCOUNTER (OUTPATIENT)
Dept: RADIOLOGY | Facility: MEDICAL CENTER | Age: 65
End: 2025-04-29
Payer: MEDICARE

## 2025-04-29 DIAGNOSIS — R31.0 GROSS HEMATURIA: ICD-10-CM

## 2025-04-29 PROCEDURE — 700117 HCHG RX CONTRAST REV CODE 255

## 2025-04-29 PROCEDURE — 74178 CT ABD&PLV WO CNTR FLWD CNTR: CPT

## 2025-04-29 RX ADMIN — IOHEXOL 100 ML: 350 INJECTION, SOLUTION INTRAVENOUS at 13:06

## 2025-05-01 ENCOUNTER — TELEPHONE (OUTPATIENT)
Dept: UROLOGY | Facility: MEDICAL CENTER | Age: 65
End: 2025-05-01
Payer: MEDICARE

## 2025-05-02 ENCOUNTER — TELEPHONE (OUTPATIENT)
Dept: UROLOGY | Facility: MEDICAL CENTER | Age: 65
End: 2025-05-02
Payer: MEDICARE

## 2025-05-14 ENCOUNTER — PROCEDURE VISIT (OUTPATIENT)
Dept: UROLOGY | Facility: MEDICAL CENTER | Age: 65
End: 2025-05-14
Payer: MEDICARE

## 2025-05-14 DIAGNOSIS — N39.0 RECURRENT UTI: Primary | ICD-10-CM

## 2025-05-14 DIAGNOSIS — R31.0 GROSS HEMATURIA: ICD-10-CM

## 2025-05-14 DIAGNOSIS — N20.0 KIDNEY STONE: ICD-10-CM

## 2025-05-14 LAB
APPEARANCE UR: NORMAL
BILIRUB UR STRIP-MCNC: NORMAL MG/DL
COLOR UR AUTO: NORMAL
GLUCOSE UR STRIP.AUTO-MCNC: NORMAL MG/DL
KETONES UR STRIP.AUTO-MCNC: 15 MG/DL
LEUKOCYTE ESTERASE UR QL STRIP.AUTO: NORMAL
NITRITE UR QL STRIP.AUTO: NORMAL
PH UR STRIP.AUTO: 6 [PH] (ref 5–8)
PROT UR QL STRIP: NORMAL MG/DL
RBC UR QL AUTO: NORMAL
SP GR UR STRIP.AUTO: 1.02
UROBILINOGEN UR STRIP-MCNC: 0.2 MG/DL

## 2025-05-14 PROCEDURE — 81002 URINALYSIS NONAUTO W/O SCOPE: CPT | Performed by: STUDENT IN AN ORGANIZED HEALTH CARE EDUCATION/TRAINING PROGRAM

## 2025-05-14 PROCEDURE — 52000 CYSTOURETHROSCOPY: CPT | Performed by: STUDENT IN AN ORGANIZED HEALTH CARE EDUCATION/TRAINING PROGRAM

## 2025-05-14 NOTE — PROGRESS NOTES
Subjective  Sandhya Christine is a 65 y.o. female who presents today for cystoscopy to evaluate Gross Hematuria. She has a lot of urinary frequency and urgency.    Family History   Problem Relation Age of Onset    No Known Problems Mother     Cancer Father         esophageal    Throat Cancer Father     No Known Problems Sister     No Known Problems Brother        Social History     Socioeconomic History    Marital status:      Spouse name: Not on file    Number of children: Not on file    Years of education: Not on file    Highest education level: Not on file   Occupational History    Not on file   Tobacco Use    Smoking status: Never     Passive exposure: Never    Smokeless tobacco: Never   Vaping Use    Vaping status: Never Used   Substance and Sexual Activity    Alcohol use: Yes     Alcohol/week: 12.0 oz     Types: 20 Glasses of wine per week    Drug use: Never    Sexual activity: Not on file   Other Topics Concern    Not on file   Social History Narrative    Not on file     Social Drivers of Health     Financial Resource Strain: Not on file   Food Insecurity: Not on file   Transportation Needs: Not on file   Physical Activity: Not on file   Stress: Not on file   Social Connections: Not on file   Intimate Partner Violence: Not on file   Housing Stability: Not on file       Past Surgical History[1]    Past Medical History[2]    Current Medications[3]    Allergies[4]    Objective  There were no vitals taken for this visit.  Physical Exam  Constitutional:       Appearance: Normal appearance.   HENT:      Head: Normocephalic.   Pulmonary:      Effort: Pulmonary effort is normal.   Skin:     General: Skin is warm and dry.   Neurological:      General: No focal deficit present.      Mental Status: She is alert.   Psychiatric:         Mood and Affect: Mood normal.         Behavior: Behavior normal.         Labs:     POC UA  Lab Results   Component Value Date/Time    POCCOLOR dark yellow 05/14/2025 10:52 AM     POCAPPEAR slightly cloudy 05/14/2025 10:52 AM    POCLEUKEST small 05/14/2025 10:52 AM    POCNITRITE neg 05/14/2025 10:52 AM    POCUROBILIGE 0.2 05/14/2025 10:52 AM    POCPROTEIN neg 05/14/2025 10:52 AM    POCURPH 6.0 05/14/2025 10:52 AM    POCBLOOD neg 05/14/2025 10:52 AM    POCSPGRV 1.020 05/14/2025 10:52 AM    POCKETONES 15 05/14/2025 10:52 AM    POCBILIRUBIN neg 05/14/2025 10:52 AM    POCGLUCUA neg 05/14/2025 10:52 AM       A1C  Lab Results   Component Value Date/Time    HBA1C 5.6 04/14/2025 0759    AVGLUC 114 04/14/2025 0759       Imaging:     CT-ABDOMEN & PELVIS UROGRAM 04/29/2025    Narrative  4/29/2025 12:49 PM    HISTORY/REASON FOR EXAM:  gross hematuria x2 weeks.      TECHNIQUE/EXAM DESCRIPTION:  CT Urogram    Initial precontrast images were obtained from the diaphragmatic domes through the pubic symphysis using helical technique.    Following this, 100 mL of Omnipaque 350 nonionic contrast was administered, and postcontrast nephrographic phase thin-section helical scanning obtained from the diaphragmatic domes through the pubic symphysis.    Additional 10-minute delayed imaging is performed from the diaphragmatic domes through the pubic symphysis to evaluate the ureters. Coronal MIP reconstructions of the delayed phase are also performed.    Low dose optimization technique was utilized for this CT exam including automated exposure control and adjustment of the mA and/or kV according to patient size.    COMPARISON: None.    FINDINGS:    The lung bases are clear. There are no effusions.    The liver shows diffuse hypodensity consistent with fatty liver.    The gallbladder is normal.    The spleen is normal.    The pancreas is normal.    The adrenal glands are normal.    The kidneys show uniform nephrograms. There is no hydronephrosis.    The right kidney shows a 9 mm nonobstructive calculus in the lower pole (axial image 54, series 3). Hounsfield unit measurement 1254 HU.    No abnormal calcifications  along the course of the right ureter.    The left kidney shows no renal calculus and there are no calcifications along the course of the left ureter. There are couple of tiny subcentimeter cysts of the left kidney.    The abdominal aorta shows normal caliber. Minimal atherosclerotic calcification. There is no retroperitoneal/para-aortic adenopathy.    The abdominal and pelvic bowel loops show diverticulosis of the colon without evidence of acute diverticulitis.    There is no free air or free fluid. No evidence of bowel obstruction.    The appendix is normal and mostly air-filled.    The bladder shows no enhancing mass lesion.    The uterus is anteverted. There are no adnexal masses.    There is no pelvic or inguinal adenopathy. There are a few phleboliths in the pelvis.    Midline dehiscence of the rectus muscles. Tiny fat-containing umbilical hernia.    The bony structures show L4-5 grade 1 spondylolisthesis. There are Schmorl's node endplate irregularities. No pathologic vertebral body collapse or destructive lesion.    Impression  1.  Fatty liver.  2.  9 mm nonobstructive calculus in the lower pole of the right kidney.  3.  Diverticulosis of the colon without acute diverticulitis.  4.  L4-5 grade 1 spondylolisthesis.    Procedure    Procedure performed: Cystoscopy      Surgeon: Dr. Nayan Cobian    Indications For Procedure: Gross Hematuria    Blood Loss: 0 cc     Anesthesia: Lidocaine jelly     Specimen: none     Findings:     1. Urethra: no lesions or masses    2. Bladder: no stones, moderate to severe trabeculations, area of erythema on left lateral bladder wall near dome    3. Bilateral ureteral jets observed with clear efflux bilaterally     Description of Procedure: The patient was prepped and draped in the usual sterile fashion.  A 17Fr flexible cystoscope was advanced along the urethra into the bladder under direct vision.  We performed a thorough cystoscopic evaluation patient's bladder including  retroflexion, findings noted above.  We removed the cystoscope under direct vision to evaluate the urethra, findings noted above.  This concluded the procedure, the patient tolerated it well. She has symptoms of overactive bladder and moderate to severe trabeculations of her bladder. She may benefit from bladder botox injections in the future.    Assessment  The patient was instructed to call our office if she develops any signs of infection including increased frequency, urgency, dysuria, fever, or chills.  We discussed the results of the cystoscopy with the patient, all questions and concerns addressed. I recommend scheduling for cystoscopy with right ureteroscopy with laser lithotripsy and ureteral stent placement as the stone could be the source of hematuria as well as recurrent urinary tract infections. I recommend cystoscopy and possible bladder biopsy at the same time if the area of erythema persists.    Plan    1. Recurrent UTI  - POCT Urinalysis    2. Gross hematuria  - POCT Urinalysis    Schedule for cystoscopy with right ureteroscopy, laser lithotripsy, and ureteral stent placement, possible bladder biopsy         [1]   Past Surgical History:  Procedure Laterality Date    PB TOTAL KNEE ARTHROPLASTY Right 5/1/2024    Procedure: RIGHT TOTAL KNEE ARTHROPLASTY;  Surgeon: Juno Templeton M.D.;  Location: [facility];  Service: Orthopedics    ORIF, SHOULDER Right    [2]   Past Medical History:  Diagnosis Date    Seasonal allergies    [3]   Current Outpatient Medications   Medication Sig Dispense Refill    telmisartan (MICARDIS) 20 MG tablet Take 1 Tablet by mouth every day. 100 Tablet 3    estradiol (ESTRACE) 0.1 MG/GM vaginal cream 10 mcg application daily x 2 weeks.  Then apply Monday, Wednesday and Friday indefinitely for maintenance. 42.5 g 1    Fluticasone Propionate (FLONASE NA) Administer  into affected nostril(S).      loratadine (CLARITIN) 10 MG Tab Take  by mouth.       No current  facility-administered medications for this visit.   [4] No Known Allergies

## 2025-05-16 ENCOUNTER — TELEPHONE (OUTPATIENT)
Dept: UROLOGY | Facility: MEDICAL CENTER | Age: 65
End: 2025-05-16
Payer: MEDICARE

## 2025-05-16 NOTE — TELEPHONE ENCOUNTER
Call to patient regarding questions about cystoscopy.     All questions answered.    She reported that she has surgery 5-6 weeks from now.    She has follow up in a few weeks.

## 2025-06-02 DIAGNOSIS — I10 PRIMARY HYPERTENSION: ICD-10-CM

## 2025-06-02 RX ORDER — TELMISARTAN 20 MG/1
20 TABLET ORAL DAILY
Qty: 100 TABLET | Refills: 2 | Status: SHIPPED | OUTPATIENT
Start: 2025-06-02 | End: 2026-07-07

## 2025-06-02 NOTE — TELEPHONE ENCOUNTER
Received request via: Pharmacy    Was the patient seen in the last year in this department? Yes    Does the patient have an active prescription (recently filled or refills available) for medication(s) requested? No    Pharmacy Name: [facility]    Does the patient have assisted Plus and need 100-day supply? (This applies to ALL medications) Patient does not have SCP

## 2025-06-10 ENCOUNTER — APPOINTMENT (OUTPATIENT)
Dept: ADMISSIONS | Facility: MEDICAL CENTER | Age: 65
End: 2025-06-10
Attending: STUDENT IN AN ORGANIZED HEALTH CARE EDUCATION/TRAINING PROGRAM
Payer: MEDICARE

## 2025-06-16 ENCOUNTER — PRE-ADMISSION TESTING (OUTPATIENT)
Dept: ADMISSIONS | Facility: MEDICAL CENTER | Age: 65
End: 2025-06-16
Attending: STUDENT IN AN ORGANIZED HEALTH CARE EDUCATION/TRAINING PROGRAM
Payer: MEDICARE

## 2025-06-16 NOTE — PREADMIT AVS NOTE
Current Medications   Medication Instructions    multivitamin Tab Stop 7 days before surgery    CALCIUM PO Stop 7 days before surgery    Ascorbic Acid (VITAMIN C PO) Stop 7 days before surgery    Cyanocobalamin (VITAMIN B-12 PO) Stop 7 days before surgery    CRANBERRY PO Stop 7 days before surgery    TURMERIC PO Stop 7 days before surgery    telmisartan (MICARDIS) 20 MG tablet Stop 24 hours before surgery    estradiol (ESTRACE) 0.1 MG/GM vaginal cream Hold medication day of procedure    Fluticasone Propionate (FLONASE NA) Continue taking as prescribed.    loratadine (CLARITIN) 10 MG Tab Continue taking as prescribed.

## 2025-06-17 ENCOUNTER — OFFICE VISIT (OUTPATIENT)
Dept: UROLOGY | Facility: MEDICAL CENTER | Age: 65
End: 2025-06-17
Payer: MEDICARE

## 2025-06-17 DIAGNOSIS — N39.0 RECURRENT UTI: Primary | ICD-10-CM

## 2025-06-17 LAB
APPEARANCE UR: NORMAL
BILIRUB UR STRIP-MCNC: NORMAL MG/DL
COLOR UR AUTO: YELLOW
GLUCOSE UR STRIP.AUTO-MCNC: NORMAL MG/DL
KETONES UR STRIP.AUTO-MCNC: NORMAL MG/DL
LEUKOCYTE ESTERASE UR QL STRIP.AUTO: NORMAL
NITRITE UR QL STRIP.AUTO: NORMAL
PH UR STRIP.AUTO: 7 [PH] (ref 5–8)
POC POST-VOID: 37 ML
POC PRE-VOID: NORMAL
PROT UR QL STRIP: NORMAL MG/DL
RBC UR QL AUTO: NORMAL
SP GR UR STRIP.AUTO: 1.01
UROBILINOGEN UR STRIP-MCNC: 1 MG/DL

## 2025-06-17 PROCEDURE — 51798 US URINE CAPACITY MEASURE: CPT

## 2025-06-17 PROCEDURE — 81002 URINALYSIS NONAUTO W/O SCOPE: CPT

## 2025-06-17 PROCEDURE — 99213 OFFICE O/P EST LOW 20 MIN: CPT

## 2025-06-17 NOTE — PROGRESS NOTES
Subjective  Sandhya Christine is a 65 y.o. female who presents today for follow-up for recurrent urinary tract infections/gross hematuria.    She has cystoscopy with right ureteroscopy laser lithotripsy and possible stent placement scheduled with Dr. Cobian on 7/8/2025    Today, she is here for surgical questions, to review if she is having any underlying symptoms related to recurrent urinary tract infection/interstitial cystitis.     She denies any urinary tract infection-like symptoms today in the clinic.  She denies any lower abdominal pain pelvic pressure, dysuria, vaginal itching, and vaginal dryness    She reports that when she was driving to [place], she had a left lower abdominal pressure which resolved.    She reported she has regular bowel movements daily sometimes multiple times a day.  Soft    She reports she is now using the vaginal estrogen 3 days a week.    Family History   Problem Relation Age of Onset    No Known Problems Mother     Cancer Father         esophageal    Throat Cancer Father     No Known Problems Sister     No Known Problems Brother        Social History     Socioeconomic History    Marital status:      Spouse name: Not on file    Number of children: Not on file    Years of education: Not on file    Highest education level: Not on file   Occupational History    Not on file   Tobacco Use    Smoking status: Never     Passive exposure: Past    Smokeless tobacco: Never   Vaping Use    Vaping status: Never Used   Substance and Sexual Activity    Alcohol use: Yes     Alcohol/week: 12.0 oz     Types: 20 Glasses of wine per week     Comment: 3 glasses per day    Drug use: Not Currently     Comment: not since high school    Sexual activity: Not on file   Other Topics Concern    Not on file   Social History Narrative    Not on file     Social Drivers of Health     Financial Resource Strain: Not on file   Food Insecurity: Not on file   Transportation Needs: Not on file   Physical  Activity: Not on file   Stress: Not on file   Social Connections: Not on file   Intimate Partner Violence: Not on file   Housing Stability: Not on file       Past Surgical History[1]    Past Medical History[2]    Current Medications[3]    Allergies[4]    Objective  There were no vitals taken for this visit.  Physical Exam  Constitutional:       Appearance: Normal appearance.   HENT:      Head: Normocephalic and atraumatic.   Eyes:      Extraocular Movements: Extraocular movements intact.   Pulmonary:      Effort: Pulmonary effort is normal.   Genitourinary:     Comments: PVR 37  Skin:     General: Skin is dry.   Neurological:      Mental Status: She is alert.   Psychiatric:         Mood and Affect: Mood normal.       Labs:   POCT UA   Lab Results   Component Value Date/Time    POCCOLOR yellow 06/17/2025 03:12 AM    POCAPPEAR slightly cloudy 06/17/2025 03:12 AM    POCLEUKEST moderate 06/17/2025 03:12 AM    POCNITRITE neg 06/17/2025 03:12 AM    POCUROBILIGE 1.0 06/17/2025 03:12 AM    POCPROTEIN neg 06/17/2025 03:12 AM    POCURPH 7.0 06/17/2025 03:12 AM    POCBLOOD trace 06/17/2025 03:12 AM    POCSPGRV 1.015 06/17/2025 03:12 AM    POCKETONES neg 06/17/2025 03:12 AM    POCBILIRUBIN neg 06/17/2025 03:12 AM    POCGLUCUA neg 06/17/2025 03:12 AM        Imaging:   CT-ABDOMEN & PELVIS UROGRAM  Order: [id number]   Status: Final result       Next appt: 07/01/2025 at 03:45 PM in Admitting (PREADMIT C TEST ONLY 1)       Dx: Gross hematuria    Test Result Released: Yes (seen)    0 Result Notes  Details    Reading Physician Reading Date Result Priority   Dimitris Boudreaux M.D.  [phone] 4/29/2025      Narrative & Impression     4/29/2025 12:49 PM     HISTORY/REASON FOR EXAM:  gross hematuria x2 weeks.        TECHNIQUE/EXAM DESCRIPTION:  CT Urogram     Initial precontrast images were obtained from the diaphragmatic domes through the pubic symphysis using helical technique.     Following this, 100 mL of Omnipaque 350 nonionic  contrast was administered, and postcontrast nephrographic phase thin-section helical scanning obtained from the diaphragmatic domes through the pubic symphysis.     Additional 10-minute delayed imaging is performed from the diaphragmatic domes through the pubic symphysis to evaluate the ureters. Coronal MIP reconstructions of the delayed phase are also performed.     Low dose optimization technique was utilized for this CT exam including automated exposure control and adjustment of the mA and/or kV according to patient size.     COMPARISON: None.     FINDINGS:     The lung bases are clear. There are no effusions.     The liver shows diffuse hypodensity consistent with fatty liver.     The gallbladder is normal.     The spleen is normal.     The pancreas is normal.     The adrenal glands are normal.     The kidneys show uniform nephrograms. There is no hydronephrosis.     The right kidney shows a 9 mm nonobstructive calculus in the lower pole (axial image 54, series 3). Hounsfield unit measurement 1254 HU.     No abnormal calcifications along the course of the right ureter.     The left kidney shows no renal calculus and there are no calcifications along the course of the left ureter. There are couple of tiny subcentimeter cysts of the left kidney.     The abdominal aorta shows normal caliber. Minimal atherosclerotic calcification. There is no retroperitoneal/para-aortic adenopathy.     The abdominal and pelvic bowel loops show diverticulosis of the colon without evidence of acute diverticulitis.     There is no free air or free fluid. No evidence of bowel obstruction.     The appendix is normal and mostly air-filled.     The bladder shows no enhancing mass lesion.     The uterus is anteverted. There are no adnexal masses.     There is no pelvic or inguinal adenopathy. There are a few phleboliths in the pelvis.     Midline dehiscence of the rectus muscles. Tiny fat-containing umbilical hernia.     The bony structures  show L4-5 grade 1 spondylolisthesis. There are Schmorl's node endplate irregularities. No pathologic vertebral body collapse or destructive lesion.     IMPRESSION:     1.  Fatty liver.  2.  9 mm nonobstructive calculus in the lower pole of the right kidney.  3.  Diverticulosis of the colon without acute diverticulitis.  4.  L4-5 grade 1 spondylolisthesis.       Assessment    For nephrolithiasis: We discussed same-day surgery with cystoscopy ureteroscopy and laser lithotripsy in July.  All questions answered.    For interstitial cystitis versus recurrent urinary tract infections: We discussed use of gabapentin, TENS unit, avoidance of bladder irritants, and or gabapentin.  Patient reports that she is overall feeling better, and does not need any supportive care management of interstitial cystitis symptoms at this time    POC urinalysis positive for leukocytes.  Discussed    PVR in clinic today 37 mL    Return to clinic in 3 months for follow-up of interstitial cystitis versus recurrent urinary tract infections    Surgery scheduled.   will reach out to patient regarding surgical time.    Plan  Proceed with surgery for 9 mm right lower pole kidney stone with Dr. Cobian  Return to clinic in 3 months for follow-up for interstitial cystitis versus recurrent urinary tract infection  Continue vaginal estrogen 3 days a week    Per patient urinalysis and urine culture ordered for patient to complete prior to surgery    Problem List Items Addressed This Visit       Recurrent UTI - Primary    Relevant Orders    POCT Bladder Scan    POCT Urinalysis            [1]   Past Surgical History:  Procedure Laterality Date    PB TOTAL KNEE ARTHROPLASTY Right 5/1/2024    Procedure: RIGHT TOTAL KNEE ARTHROPLASTY;  Surgeon: Juno Templeton M.D.;  Location: [facility];  Service: Orthopedics    ORIF, SHOULDER Right    [2]   Past Medical History:  Diagnosis Date    Arthritis     Bowel habit changes     diarrhea and  constipation    Cataract     no surgery yet    Dental disorder     retainer at night    Heart burn     High cholesterol     Indigestion     Pneumonia     as a teenager    Renal disorder     kidney stones    Seasonal allergies     Urinary incontinence    [3]   Current Outpatient Medications   Medication Sig Dispense Refill    multivitamin Tab Take 1 Tablet by mouth every day.      CALCIUM PO Take  by mouth every day.      Ascorbic Acid (VITAMIN C PO) Take  by mouth every day.      Cyanocobalamin (VITAMIN B-12 PO) Take  by mouth every day.      CRANBERRY PO Take  by mouth every day.      TURMERIC PO Take  by mouth every day.      telmisartan (MICARDIS) 20 MG tablet Take 1 Tablet by mouth every day. 100 Tablet 2    estradiol (ESTRACE) 0.1 MG/GM vaginal cream 10 mcg application daily x 2 weeks.  Then apply Monday, Wednesday and Friday indefinitely for maintenance. 42.5 g 1    Fluticasone Propionate (FLONASE NA) Administer 2 Sprays into affected nostril(S) every day.      loratadine (CLARITIN) 10 MG Tab Take 10 mg by mouth every day.       No current facility-administered medications for this visit.   [4]   Allergies  Allergen Reactions    Oxycodone Vomiting

## 2025-07-01 ENCOUNTER — PRE-ADMISSION TESTING (OUTPATIENT)
Dept: ADMISSIONS | Facility: MEDICAL CENTER | Age: 65
End: 2025-07-01
Attending: STUDENT IN AN ORGANIZED HEALTH CARE EDUCATION/TRAINING PROGRAM
Payer: MEDICARE

## 2025-07-01 DIAGNOSIS — Z01.812 PRE-OPERATIVE LABORATORY EXAMINATION: Primary | ICD-10-CM

## 2025-07-01 DIAGNOSIS — Z01.810 PRE-OPERATIVE CARDIOVASCULAR EXAMINATION: ICD-10-CM

## 2025-07-01 LAB
ALBUMIN SERPL BCP-MCNC: 4.9 G/DL (ref 3.2–4.9)
ALBUMIN/GLOB SERPL: 1.8 G/DL
ALP SERPL-CCNC: 56 U/L (ref 30–99)
ALT SERPL-CCNC: 29 U/L (ref 2–50)
ANION GAP SERPL CALC-SCNC: 14 MMOL/L (ref 7–16)
APPEARANCE UR: CLEAR
AST SERPL-CCNC: 30 U/L (ref 12–45)
BACTERIA #/AREA URNS HPF: ABNORMAL /HPF
BILIRUB SERPL-MCNC: 1.1 MG/DL (ref 0.1–1.5)
BILIRUB UR QL STRIP.AUTO: NEGATIVE
BUN SERPL-MCNC: 9 MG/DL (ref 8–22)
CALCIUM ALBUM COR SERPL-MCNC: 8.9 MG/DL (ref 8.5–10.5)
CALCIUM SERPL-MCNC: 9.6 MG/DL (ref 8.5–10.5)
CASTS URNS QL MICRO: ABNORMAL /LPF (ref 0–2)
CHLORIDE SERPL-SCNC: 99 MMOL/L (ref 96–112)
CO2 SERPL-SCNC: 25 MMOL/L (ref 20–33)
COLOR UR: YELLOW
CREAT SERPL-MCNC: 0.65 MG/DL (ref 0.5–1.4)
EKG IMPRESSION: NORMAL
EPITHELIAL CELLS 1715: ABNORMAL /HPF (ref 0–5)
GFR SERPLBLD CREATININE-BSD FMLA CKD-EPI: 97 ML/MIN/1.73 M 2
GLOBULIN SER CALC-MCNC: 2.8 G/DL (ref 1.9–3.5)
GLUCOSE SERPL-MCNC: 96 MG/DL (ref 65–99)
GLUCOSE UR STRIP.AUTO-MCNC: NEGATIVE MG/DL
KETONES UR STRIP.AUTO-MCNC: NEGATIVE MG/DL
LEUKOCYTE ESTERASE UR QL STRIP.AUTO: ABNORMAL
MICRO URNS: ABNORMAL
NITRITE UR QL STRIP.AUTO: NEGATIVE
PH UR STRIP.AUTO: 7.5 [PH] (ref 5–8)
POTASSIUM SERPL-SCNC: 3.8 MMOL/L (ref 3.6–5.5)
PROT SERPL-MCNC: 7.7 G/DL (ref 6–8.2)
PROT UR QL STRIP: NEGATIVE MG/DL
RBC # URNS HPF: ABNORMAL /HPF (ref 0–2)
RBC UR QL AUTO: NEGATIVE
SODIUM SERPL-SCNC: 138 MMOL/L (ref 135–145)
SP GR UR STRIP.AUTO: 1.01
UROBILINOGEN UR STRIP.AUTO-MCNC: 1 EU/DL
WBC #/AREA URNS HPF: ABNORMAL /HPF

## 2025-07-01 PROCEDURE — 80053 COMPREHEN METABOLIC PANEL: CPT

## 2025-07-01 PROCEDURE — 87086 URINE CULTURE/COLONY COUNT: CPT

## 2025-07-01 PROCEDURE — 36415 COLL VENOUS BLD VENIPUNCTURE: CPT

## 2025-07-01 PROCEDURE — 93005 ELECTROCARDIOGRAM TRACING: CPT | Mod: TC

## 2025-07-01 PROCEDURE — 93010 ELECTROCARDIOGRAM REPORT: CPT | Performed by: INTERNAL MEDICINE

## 2025-07-01 PROCEDURE — 81001 URINALYSIS AUTO W/SCOPE: CPT

## 2025-07-03 ENCOUNTER — HOSPITAL ENCOUNTER (OUTPATIENT)
Facility: MEDICAL CENTER | Age: 65
End: 2025-07-03
Attending: PHYSICIAN ASSISTANT
Payer: MEDICARE

## 2025-07-03 DIAGNOSIS — N39.0 RECURRENT UTI: Primary | ICD-10-CM

## 2025-07-03 LAB
BACTERIA UR CULT: NORMAL
SIGNIFICANT IND 70042: NORMAL
SITE SITE: NORMAL
SOURCE SOURCE: NORMAL

## 2025-07-03 PROCEDURE — 87086 URINE CULTURE/COLONY COUNT: CPT

## 2025-07-03 PROCEDURE — 87186 SC STD MICRODIL/AGAR DIL: CPT

## 2025-07-03 PROCEDURE — 87077 CULTURE AEROBIC IDENTIFY: CPT

## 2025-07-06 LAB
BACTERIA UR CULT: ABNORMAL
BACTERIA UR CULT: ABNORMAL
SIGNIFICANT IND 70042: ABNORMAL
SITE SITE: ABNORMAL
SOURCE SOURCE: ABNORMAL

## 2025-07-07 ENCOUNTER — ANESTHESIA EVENT (OUTPATIENT)
Dept: SURGERY | Facility: MEDICAL CENTER | Age: 65
End: 2025-07-07
Payer: MEDICARE

## 2025-07-07 ENCOUNTER — TELEPHONE (OUTPATIENT)
Dept: UROLOGY | Facility: MEDICAL CENTER | Age: 65
End: 2025-07-07
Payer: MEDICARE

## 2025-07-07 RX ORDER — SULFAMETHOXAZOLE AND TRIMETHOPRIM 800; 160 MG/1; MG/1
1 TABLET ORAL 2 TIMES DAILY
Qty: 14 TABLET | Refills: 0 | Status: SHIPPED | OUTPATIENT
Start: 2025-07-07 | End: 2025-07-14

## 2025-07-08 ENCOUNTER — APPOINTMENT (OUTPATIENT)
Dept: RADIOLOGY | Facility: MEDICAL CENTER | Age: 65
End: 2025-07-08
Attending: STUDENT IN AN ORGANIZED HEALTH CARE EDUCATION/TRAINING PROGRAM
Payer: MEDICARE

## 2025-07-08 ENCOUNTER — PHARMACY VISIT (OUTPATIENT)
Dept: PHARMACY | Facility: MEDICAL CENTER | Age: 65
End: 2025-07-08
Payer: COMMERCIAL

## 2025-07-08 ENCOUNTER — HOSPITAL ENCOUNTER (OUTPATIENT)
Facility: MEDICAL CENTER | Age: 65
End: 2025-07-08
Attending: STUDENT IN AN ORGANIZED HEALTH CARE EDUCATION/TRAINING PROGRAM | Admitting: STUDENT IN AN ORGANIZED HEALTH CARE EDUCATION/TRAINING PROGRAM
Payer: MEDICARE

## 2025-07-08 ENCOUNTER — ANESTHESIA (OUTPATIENT)
Dept: SURGERY | Facility: MEDICAL CENTER | Age: 65
End: 2025-07-08
Payer: MEDICARE

## 2025-07-08 VITALS
RESPIRATION RATE: 18 BRPM | TEMPERATURE: 97.6 F | HEART RATE: 89 BPM | HEIGHT: 68 IN | BODY MASS INDEX: 24.46 KG/M2 | DIASTOLIC BLOOD PRESSURE: 72 MMHG | OXYGEN SATURATION: 94 % | SYSTOLIC BLOOD PRESSURE: 145 MMHG | WEIGHT: 161.38 LBS

## 2025-07-08 LAB — PATHOLOGY CONSULT NOTE: NORMAL

## 2025-07-08 PROCEDURE — 88305 TISSUE EXAM BY PATHOLOGIST: CPT | Mod: 26 | Performed by: PATHOLOGY

## 2025-07-08 PROCEDURE — 700102 HCHG RX REV CODE 250 W/ 637 OVERRIDE(OP): Performed by: ANESTHESIOLOGY

## 2025-07-08 PROCEDURE — C1747 HCHG SHELL REV 278 C1747: HCPCS | Performed by: STUDENT IN AN ORGANIZED HEALTH CARE EDUCATION/TRAINING PROGRAM

## 2025-07-08 PROCEDURE — 160046 HCHG PACU - 1ST 60 MINS PHASE II: Performed by: STUDENT IN AN ORGANIZED HEALTH CARE EDUCATION/TRAINING PROGRAM

## 2025-07-08 PROCEDURE — 160025 RECOVERY II MINUTES (STATS): Performed by: STUDENT IN AN ORGANIZED HEALTH CARE EDUCATION/TRAINING PROGRAM

## 2025-07-08 PROCEDURE — 52356 CYSTO/URETERO W/LITHOTRIPSY: CPT | Mod: RT | Performed by: STUDENT IN AN ORGANIZED HEALTH CARE EDUCATION/TRAINING PROGRAM

## 2025-07-08 PROCEDURE — A9270 NON-COVERED ITEM OR SERVICE: HCPCS | Performed by: ANESTHESIOLOGY

## 2025-07-08 PROCEDURE — C2617 STENT, NON-COR, TEM W/O DEL: HCPCS | Performed by: STUDENT IN AN ORGANIZED HEALTH CARE EDUCATION/TRAINING PROGRAM

## 2025-07-08 PROCEDURE — 160039 HCHG SURGERY MINUTES - EA ADDL 1 MIN LEVEL 3: Performed by: STUDENT IN AN ORGANIZED HEALTH CARE EDUCATION/TRAINING PROGRAM

## 2025-07-08 PROCEDURE — 160191 HCHG ANESTHESIA STANDARD: Performed by: STUDENT IN AN ORGANIZED HEALTH CARE EDUCATION/TRAINING PROGRAM

## 2025-07-08 PROCEDURE — 74018 RADEX ABDOMEN 1 VIEW: CPT

## 2025-07-08 PROCEDURE — 160048 HCHG OR STATISTICAL LEVEL 1-5: Performed by: STUDENT IN AN ORGANIZED HEALTH CARE EDUCATION/TRAINING PROGRAM

## 2025-07-08 PROCEDURE — 700101 HCHG RX REV CODE 250: Performed by: ANESTHESIOLOGY

## 2025-07-08 PROCEDURE — 700111 HCHG RX REV CODE 636 W/ 250 OVERRIDE (IP): Performed by: ANESTHESIOLOGY

## 2025-07-08 PROCEDURE — C1769 GUIDE WIRE: HCPCS | Performed by: STUDENT IN AN ORGANIZED HEALTH CARE EDUCATION/TRAINING PROGRAM

## 2025-07-08 PROCEDURE — 700105 HCHG RX REV CODE 258: Performed by: STUDENT IN AN ORGANIZED HEALTH CARE EDUCATION/TRAINING PROGRAM

## 2025-07-08 PROCEDURE — RXMED WILLOW AMBULATORY MEDICATION CHARGE: Performed by: STUDENT IN AN ORGANIZED HEALTH CARE EDUCATION/TRAINING PROGRAM

## 2025-07-08 PROCEDURE — 160002 HCHG RECOVERY MINUTES (STAT): Performed by: STUDENT IN AN ORGANIZED HEALTH CARE EDUCATION/TRAINING PROGRAM

## 2025-07-08 PROCEDURE — 160028 HCHG SURGERY MINUTES - 1ST 30 MINS LEVEL 3: Performed by: STUDENT IN AN ORGANIZED HEALTH CARE EDUCATION/TRAINING PROGRAM

## 2025-07-08 PROCEDURE — 88305 TISSUE EXAM BY PATHOLOGIST: CPT | Performed by: PATHOLOGY

## 2025-07-08 PROCEDURE — C1894 INTRO/SHEATH, NON-LASER: HCPCS | Performed by: STUDENT IN AN ORGANIZED HEALTH CARE EDUCATION/TRAINING PROGRAM

## 2025-07-08 PROCEDURE — 160015 HCHG STAT PREOP MINUTES: Performed by: STUDENT IN AN ORGANIZED HEALTH CARE EDUCATION/TRAINING PROGRAM

## 2025-07-08 PROCEDURE — 160193 HCHG PACU STANDARD - 1ST 60 MINS: Performed by: STUDENT IN AN ORGANIZED HEALTH CARE EDUCATION/TRAINING PROGRAM

## 2025-07-08 PROCEDURE — 82365 CALCULUS SPECTROSCOPY: CPT

## 2025-07-08 DEVICE — STENT UROLOGICAL POLARIS 6X26 ULTRA: Type: IMPLANTABLE DEVICE | Site: BLADDER | Status: FUNCTIONAL

## 2025-07-08 RX ORDER — HYDROMORPHONE HYDROCHLORIDE 1 MG/ML
0.2 INJECTION, SOLUTION INTRAMUSCULAR; INTRAVENOUS; SUBCUTANEOUS
Status: DISCONTINUED | OUTPATIENT
Start: 2025-07-08 | End: 2025-07-08 | Stop reason: HOSPADM

## 2025-07-08 RX ORDER — HYDRALAZINE HYDROCHLORIDE 20 MG/ML
5 INJECTION INTRAMUSCULAR; INTRAVENOUS
Status: DISCONTINUED | OUTPATIENT
Start: 2025-07-08 | End: 2025-07-08 | Stop reason: HOSPADM

## 2025-07-08 RX ORDER — DIPHENHYDRAMINE HYDROCHLORIDE 50 MG/ML
12.5 INJECTION, SOLUTION INTRAMUSCULAR; INTRAVENOUS
Status: DISCONTINUED | OUTPATIENT
Start: 2025-07-08 | End: 2025-07-08 | Stop reason: HOSPADM

## 2025-07-08 RX ORDER — KETOROLAC TROMETHAMINE 15 MG/ML
INJECTION, SOLUTION INTRAMUSCULAR; INTRAVENOUS PRN
Status: DISCONTINUED | OUTPATIENT
Start: 2025-07-08 | End: 2025-07-08 | Stop reason: SURG

## 2025-07-08 RX ORDER — SODIUM CHLORIDE, SODIUM LACTATE, POTASSIUM CHLORIDE, CALCIUM CHLORIDE 600; 310; 30; 20 MG/100ML; MG/100ML; MG/100ML; MG/100ML
INJECTION, SOLUTION INTRAVENOUS CONTINUOUS
Status: ACTIVE | OUTPATIENT
Start: 2025-07-08 | End: 2025-07-08

## 2025-07-08 RX ORDER — DEXAMETHASONE SODIUM PHOSPHATE 4 MG/ML
INJECTION, SOLUTION INTRA-ARTICULAR; INTRALESIONAL; INTRAMUSCULAR; INTRAVENOUS; SOFT TISSUE PRN
Status: DISCONTINUED | OUTPATIENT
Start: 2025-07-08 | End: 2025-07-08 | Stop reason: SURG

## 2025-07-08 RX ORDER — ONDANSETRON 2 MG/ML
INJECTION INTRAMUSCULAR; INTRAVENOUS PRN
Status: DISCONTINUED | OUTPATIENT
Start: 2025-07-08 | End: 2025-07-08 | Stop reason: SURG

## 2025-07-08 RX ORDER — TAMSULOSIN HYDROCHLORIDE 0.4 MG/1
0.4 CAPSULE ORAL
Qty: 30 CAPSULE | Refills: 0 | Status: SHIPPED | OUTPATIENT
Start: 2025-07-08 | End: 2025-10-06

## 2025-07-08 RX ORDER — PHENYLEPHRINE HCL IN 0.9% NACL 1 MG/10 ML
SYRINGE (ML) INTRAVENOUS PRN
Status: DISCONTINUED | OUTPATIENT
Start: 2025-07-08 | End: 2025-07-08 | Stop reason: SURG

## 2025-07-08 RX ORDER — LIDOCAINE HYDROCHLORIDE 20 MG/ML
INJECTION, SOLUTION EPIDURAL; INFILTRATION; INTRACAUDAL; PERINEURAL PRN
Status: DISCONTINUED | OUTPATIENT
Start: 2025-07-08 | End: 2025-07-08 | Stop reason: SURG

## 2025-07-08 RX ORDER — ALBUTEROL SULFATE 5 MG/ML
2.5 SOLUTION RESPIRATORY (INHALATION)
Status: DISCONTINUED | OUTPATIENT
Start: 2025-07-08 | End: 2025-07-08 | Stop reason: HOSPADM

## 2025-07-08 RX ORDER — METOCLOPRAMIDE HYDROCHLORIDE 5 MG/ML
INJECTION INTRAMUSCULAR; INTRAVENOUS PRN
Status: DISCONTINUED | OUTPATIENT
Start: 2025-07-08 | End: 2025-07-08 | Stop reason: SURG

## 2025-07-08 RX ORDER — SODIUM CHLORIDE, SODIUM LACTATE, POTASSIUM CHLORIDE, CALCIUM CHLORIDE 600; 310; 30; 20 MG/100ML; MG/100ML; MG/100ML; MG/100ML
INJECTION, SOLUTION INTRAVENOUS CONTINUOUS
Status: DISCONTINUED | OUTPATIENT
Start: 2025-07-08 | End: 2025-07-08 | Stop reason: HOSPADM

## 2025-07-08 RX ORDER — HYDROMORPHONE HYDROCHLORIDE 1 MG/ML
0.4 INJECTION, SOLUTION INTRAMUSCULAR; INTRAVENOUS; SUBCUTANEOUS
Status: DISCONTINUED | OUTPATIENT
Start: 2025-07-08 | End: 2025-07-08 | Stop reason: HOSPADM

## 2025-07-08 RX ORDER — HALOPERIDOL 5 MG/ML
1 INJECTION INTRAMUSCULAR
Status: DISCONTINUED | OUTPATIENT
Start: 2025-07-08 | End: 2025-07-08 | Stop reason: HOSPADM

## 2025-07-08 RX ORDER — EPHEDRINE SULFATE 50 MG/ML
INJECTION, SOLUTION INTRAVENOUS PRN
Status: DISCONTINUED | OUTPATIENT
Start: 2025-07-08 | End: 2025-07-08 | Stop reason: SURG

## 2025-07-08 RX ORDER — ONDANSETRON 2 MG/ML
4 INJECTION INTRAMUSCULAR; INTRAVENOUS
Status: DISCONTINUED | OUTPATIENT
Start: 2025-07-08 | End: 2025-07-08 | Stop reason: HOSPADM

## 2025-07-08 RX ORDER — OXYBUTYNIN CHLORIDE 10 MG/1
10 TABLET, EXTENDED RELEASE ORAL DAILY
Qty: 14 TABLET | Refills: 0 | Status: SHIPPED | OUTPATIENT
Start: 2025-07-08

## 2025-07-08 RX ORDER — HYDROMORPHONE HYDROCHLORIDE 1 MG/ML
0.1 INJECTION, SOLUTION INTRAMUSCULAR; INTRAVENOUS; SUBCUTANEOUS
Status: DISCONTINUED | OUTPATIENT
Start: 2025-07-08 | End: 2025-07-08 | Stop reason: HOSPADM

## 2025-07-08 RX ORDER — PHENAZOPYRIDINE HYDROCHLORIDE 200 MG/1
200 TABLET, FILM COATED ORAL 3 TIMES DAILY PRN
Qty: 6 TABLET | Refills: 0 | Status: SHIPPED | OUTPATIENT
Start: 2025-07-08 | End: 2025-07-10

## 2025-07-08 RX ORDER — LABETALOL HYDROCHLORIDE 5 MG/ML
5 INJECTION, SOLUTION INTRAVENOUS
Status: DISCONTINUED | OUTPATIENT
Start: 2025-07-08 | End: 2025-07-08 | Stop reason: HOSPADM

## 2025-07-08 RX ORDER — CEFAZOLIN SODIUM 1 G/3ML
INJECTION, POWDER, FOR SOLUTION INTRAMUSCULAR; INTRAVENOUS PRN
Status: DISCONTINUED | OUTPATIENT
Start: 2025-07-08 | End: 2025-07-08 | Stop reason: SURG

## 2025-07-08 RX ORDER — ACETAMINOPHEN 500 MG
1000 TABLET ORAL ONCE
Status: COMPLETED | OUTPATIENT
Start: 2025-07-08 | End: 2025-07-08

## 2025-07-08 RX ORDER — EPHEDRINE SULFATE 50 MG/ML
5 INJECTION, SOLUTION INTRAVENOUS
Status: DISCONTINUED | OUTPATIENT
Start: 2025-07-08 | End: 2025-07-08 | Stop reason: HOSPADM

## 2025-07-08 RX ADMIN — FENTANYL CITRATE 50 MCG: 50 INJECTION, SOLUTION INTRAMUSCULAR; INTRAVENOUS at 11:09

## 2025-07-08 RX ADMIN — EPHEDRINE SULFATE 10 MG: 50 INJECTION, SOLUTION INTRAVENOUS at 11:27

## 2025-07-08 RX ADMIN — CEFAZOLIN 2 G: 1 INJECTION, POWDER, FOR SOLUTION INTRAMUSCULAR; INTRAVENOUS at 11:12

## 2025-07-08 RX ADMIN — KETOROLAC TROMETHAMINE 15 MG: 15 INJECTION, SOLUTION INTRAMUSCULAR; INTRAVENOUS at 12:01

## 2025-07-08 RX ADMIN — LIDOCAINE HYDROCHLORIDE 100 MG: 20 INJECTION, SOLUTION EPIDURAL; INFILTRATION; INTRACAUDAL; PERINEURAL at 11:10

## 2025-07-08 RX ADMIN — FENTANYL CITRATE 50 MCG: 50 INJECTION, SOLUTION INTRAMUSCULAR; INTRAVENOUS at 11:16

## 2025-07-08 RX ADMIN — EPHEDRINE SULFATE 5 MG: 50 INJECTION, SOLUTION INTRAVENOUS at 11:19

## 2025-07-08 RX ADMIN — METOCLOPRAMIDE HYDROCHLORIDE 10 MG: 5 INJECTION INTRAMUSCULAR; INTRAVENOUS at 11:10

## 2025-07-08 RX ADMIN — DEXAMETHASONE SODIUM PHOSPHATE 8 MG: 4 INJECTION INTRA-ARTICULAR; INTRALESIONAL; INTRAMUSCULAR; INTRAVENOUS; SOFT TISSUE at 11:10

## 2025-07-08 RX ADMIN — ACETAMINOPHEN 1000 MG: 500 TABLET ORAL at 09:02

## 2025-07-08 RX ADMIN — Medication 100 MCG: at 11:17

## 2025-07-08 RX ADMIN — Medication 100 MCG: at 11:14

## 2025-07-08 RX ADMIN — SODIUM CHLORIDE, POTASSIUM CHLORIDE, SODIUM LACTATE AND CALCIUM CHLORIDE: 600; 310; 30; 20 INJECTION, SOLUTION INTRAVENOUS at 11:03

## 2025-07-08 RX ADMIN — FENTANYL CITRATE 50 MCG: 50 INJECTION, SOLUTION INTRAMUSCULAR; INTRAVENOUS at 11:37

## 2025-07-08 RX ADMIN — ONDANSETRON 4 MG: 2 INJECTION INTRAMUSCULAR; INTRAVENOUS at 12:01

## 2025-07-08 RX ADMIN — PROPOFOL 150 MG: 10 INJECTION, EMULSION INTRAVENOUS at 11:10

## 2025-07-08 ASSESSMENT — PAIN DESCRIPTION - PAIN TYPE
TYPE: SURGICAL PAIN

## 2025-07-08 ASSESSMENT — FIBROSIS 4 INDEX: FIB4 SCORE: 2.14

## 2025-07-08 NOTE — ANESTHESIA PROCEDURE NOTES
Airway    Date/Time: 7/8/2025 11:11 AM    Performed by: Stefania Kim M.D.  Authorized by: Stefania Kim M.D.    Location:  OR  Urgency:  Elective  Indications for Airway Management:  Anesthesia      Spontaneous Ventilation: absent    Sedation Level:  Deep  Preoxygenated: Yes    Mask Difficulty Assessment:  0 - not attempted  Final Airway Type:  Supraglottic airway  Final Supraglottic Airway:  Standard LMA    SGA Size:  4  Number of Attempts at Approach:  1

## 2025-07-08 NOTE — OR NURSING
1211: Pt arrived from OR to PACU 4. Connected to monitor. Report received from anesthesia & RN. VSS. Oxygen at 6L via mask. Breaths calm, even, and unlabored.  No signs of pain.     1225: pt complaining of intense urge to urinate. Pt taken to bathroom, steady gait, pt able to void small amount of pink tinged urine.     1234: Updated spouse on patient status in recovery.     1248:  at bedside    1258: Discharge instructions reviewed with patient and family member. All questions answered, verbalizes understanding.     1315: Pt assisted into clothing. Still waiting for meds to be tubed up from pharmacy    1333: IV and ID bands removed. Prescriptions given to pt's . Pt then escorted to car via wheelchair, accompanied by CCT. All personal belongings & discharge instructions with patient/family.

## 2025-07-08 NOTE — OP REPORT
SURGEON: Dr. Nayan Cobian      ANESTHESIA: General (general endotracheal tube)      PRE-OPERATIVE DIAGNOSIS: right intra-renal stone    POST-OPERATIVE DIAGNOSIS: Same      NAME OF PROCEDURE: Cystoscopy, right ureteroscopy with laser lithotripsy, stone basket extraction, physician interpretation of fluoroscopy total time <1 hour, and 6Fr 26 cm JJ ureteral stent placement, bladder biopsy    FINDINGS OF PROCEDURE:     Area of erythema on posterior bladder wall, biopsy taken  Successful lithotripsy and stone basket extraction of the right intra-renal stone.    Placement of right 6x26 JJ ureteral stent    EBL: 0 cc      COMPLICATIONS: None      PATIENT CONDITION: stable      INDICATIONS: Sandhya Christine is a 65 y.o. female who agreed to above procedure for further management of kidney stones after complete discussion of risks, benefits, and alternatives.      PROCEDURE:     After informed consent was obtained in the preoperative care unit, the patient was taken to the OR on a stretcher. The patient was properly identified and placed in supine position per OR protocol. The patient was given a prophylactic dose of ancef 2 grams. General (general endotracheal tube) was administered. The patient was then placed in dorsal lithotomy, prepped and draped in a standard sterile fashion.  A timeout was performed with all parties in agreement.       A 22Fr rigid cystoscope was inserted per urethra. A full inspection of the bladder was completed. There was an area of erythema on the posterior bladder wall but no lesions or masses, the UOs were in orthotopic position effluxing clear urine. The cold cup biopsy forceps were used to take a bladder biopsy of the erythematous area and the bugbee was used for cautery. No bleeding was seen with flow turned off.     Two sensor wires were passed into the right ureteral orifice up to the level of the kidney, confirmed under fluoroscopy.     A 11/13Fr x 36cm ureteral access sheath was  passed over one of the sensor wires and advanced to the level of the UPJ under fluoroscopic guidance. The inner lumen and one of the sensor wires was removed. The flexible ureteroscope was assembled and advanced through the sheath to the level of the kidney under direct vision, where a thorough pyeloscopy was performed. I proceeded with laser lithotripsy. The stone basket was used to remove any large remaining stone fragments.  I performed a final pyeloscopy which was negative for any residual stone burden. I removed the ureteroscope and ureteral access sheath under direct vision to perform a final ureteral survey. No stone fragments or ureteral injuries were seen.    A [id number] JJ ureteral stent was passed over the remaining sensor wire and into the kidney, with it’s position confirmed under fluoroscopic guidance. The wire was removed and a good proximal and distal curl were noted in the renal pelvis and bladder respectively with fluoroscopy. I drained the patient's bladder. This concluded the procedure. The patient tolerated it well and was transferred to the PACU in stable condition.        DISPOSITION: The patient will be discharged home with plan for cysto stent removal in 1-2 weeks.

## 2025-07-08 NOTE — ANESTHESIA TIME REPORT
Anesthesia Start and Stop Event Times       Date Time Event    7/8/2025 1050 Ready for Procedure     1103 Anesthesia Start     1213 Anesthesia Stop          Responsible Staff  07/08/25      Name Role Begin End    Stefania Kim M.D. Anesth 1103 1213          Overtime Reason:  no overtime (within assigned shift)    Comments:

## 2025-07-08 NOTE — ANESTHESIA PREPROCEDURE EVALUATION
Case: [id number] Date/Time: 07/08/25 0945    Procedures:       CYSTOSCOPY WITH RIGHT URETEROSCOPY LASER LITHOTRIPSY AND URETERAL STENT PLACEMENT POSSIBLE BLADDER BIOPSY (Bladder)      CYSTOSCOPY, WITH BLADDER BIOPSY (Bladder)    Anesthesia type: General    Pre-op diagnosis: KIDNEY STONE, GROSS HEMATURIA    Location: [facility] ROOM 25 / SURGERY SAME DAY [facility]    Surgeons: Nayan Cobian M.D.            Relevant Problems   ANESTHESIA (within normal limits)      PULMONARY (within normal limits)      NEURO (within normal limits)   (negative) CVA (cerebral vascular accident) (HCC)   (negative) Neuromuscular disease (HCC)   (negative) TIA (transient ischemic attack)      ENDO   (negative) Diabetes mellitus type 1 (HCC)   (negative) Diabetes mellitus, type 2 (HCC)      Other   (positive) Arthritis of right knee       Physical Exam    Airway   Mallampati: II  TM distance: >3 FB  Neck ROM: full       Cardiovascular - normal exam  Rhythm: regular  Rate: normal    (-) murmur     Dental - normal exam           Pulmonary - normal examBreath sounds clear to auscultation     Abdominal    Neurological - normal exam                   Anesthesia Plan    ASA 2       Plan - general       Airway plan will be LMA          Induction: intravenous    Postoperative Plan: Postoperative administration of opioids is intended.    Pertinent diagnostic labs and testing reviewed    Informed Consent:    Anesthetic plan and risks discussed with patient.    Use of blood products discussed with: patient whom consented to blood products.

## 2025-07-12 LAB
APPEARANCE STONE: NORMAL
COMPN STONE: NORMAL
SPECIMEN WT: 7 MG

## 2025-07-14 ENCOUNTER — OFFICE VISIT (OUTPATIENT)
Dept: UROLOGY | Facility: MEDICAL CENTER | Age: 65
End: 2025-07-14
Payer: MEDICARE

## 2025-07-14 DIAGNOSIS — N20.0 KIDNEY STONE: Primary | ICD-10-CM

## 2025-07-14 PROCEDURE — 52310 CYSTOSCOPY AND TREATMENT: CPT | Performed by: STUDENT IN AN ORGANIZED HEALTH CARE EDUCATION/TRAINING PROGRAM

## 2025-07-15 NOTE — PROGRESS NOTES
Subjective  Sandhya Christine is a 65 y.o. female who presents today for cystoscopy and ureteral stent removal     Family History   Problem Relation Age of Onset    No Known Problems Mother     Cancer Father         esophageal    Throat Cancer Father     No Known Problems Sister     No Known Problems Brother        Social History     Socioeconomic History    Marital status:      Spouse name: Not on file    Number of children: Not on file    Years of education: Not on file    Highest education level: Not on file   Occupational History    Not on file   Tobacco Use    Smoking status: Never     Passive exposure: Past    Smokeless tobacco: Never   Vaping Use    Vaping status: Never Used   Substance and Sexual Activity    Alcohol use: Yes     Alcohol/week: 12.0 oz     Types: 20 Glasses of wine per week     Comment: 3 glasses per day    Drug use: Not Currently     Comment: not since high school    Sexual activity: Not on file   Other Topics Concern    Not on file   Social History Narrative    Not on file     Social Drivers of Health     Financial Resource Strain: Not on file   Food Insecurity: Not on file   Transportation Needs: Not on file   Physical Activity: Not on file   Stress: Not on file   Social Connections: Not on file   Intimate Partner Violence: Not on file   Housing Stability: Not on file       Past Surgical History[1]    Past Medical History[2]    Current Medications[3]    Allergies[4]    Objective  There were no vitals taken for this visit.  Physical Exam  Constitutional:       Appearance: Normal appearance.   HENT:      Head: Normocephalic and atraumatic.   Pulmonary:      Effort: Pulmonary effort is normal.   Skin:     General: Skin is warm and dry.   Neurological:      General: No focal deficit present.      Mental Status: She is alert.   Psychiatric:         Mood and Affect: Mood normal.         Behavior: Behavior normal.         Labs:     POC UA  Lab Results   Component Value Date/Time     POCCOLOR yellow 06/17/2025 03:12 AM    POCAPPEAR slightly cloudy 06/17/2025 03:12 AM    POCLEUKEST moderate 06/17/2025 03:12 AM    POCNITRITE neg 06/17/2025 03:12 AM    POCUROBILIGE 1.0 06/17/2025 03:12 AM    POCPROTEIN neg 06/17/2025 03:12 AM    POCURPH 7.0 06/17/2025 03:12 AM    POCBLOOD trace 06/17/2025 03:12 AM    POCSPGRV 1.015 06/17/2025 03:12 AM    POCKETONES neg 06/17/2025 03:12 AM    POCBILIRUBIN neg 06/17/2025 03:12 AM    POCGLUCUA neg 06/17/2025 03:12 AM        A1C  Lab Results   Component Value Date/Time    HBA1C 5.6 04/14/2025 0759    AVGLUC 114 04/14/2025 0759                  SURGICAL PATHOLOGY CONSULTATION       FINAL DIAGNOSIS:     A. Bladder biopsy:          Fragment of focally denuded urothelial mucosa with associated           chronic inflammation and reactive-type changes.          No in situ or invasive carcinoma identified.          Muscularis propria present.     Imaging: none    Procedure    Procedure performed: Cystoscopy with stent removal    Surgeon: Dr. Nayan Cobian    Indications For Procedure: Nephrolithiasis    Blood Loss: 0 cc     Anesthesia: Lidocaine jelly     Specimen: none     Findings:     Ureteral stent removed in it's entirety    Description of Procedure: The patient was prepped and draped in the usual sterile fashion.  A 17Fr flexible cystoscope was advanced along the urethra into the bladder under direct vision.  Stent graspers were placed through the scope and the ureteral stent was secured. We removed the cystoscope and ureteral stent under direct vision, findings noted above.  This concluded the procedure, the patient tolerated it well.     Assessment  The patient was instructed to call our office if she develops any signs of infection including increased frequency, urgency, dysuria, fever, or chills.  We discussed the results of the cystoscopy with the patient, all questions and concerns addressed. She was given a one time dose of Bactrim DS for UTI ppx. She will follow  up in 6 weeks with a renal US. Pathology from bladder biopsy was reviewed, this was negative for malignancy.    Kidney Stone Counseling:     We discussed that there is a 50% chance that they will develop another stone in the next 2 years. I recommend they drink plenty of fluids, we discussed the total amount required varies per patient. The goal with hydration should be to have very pale/clear urine at all times. I recommend they limit red meat and salt intake. They should not decrease their calcium intake as this can increase their risk of developing stones in the future.       Plan    1. Kidney stone  - US-RENAL; Future    RTC 6 weeks  Renal US 6 weeks         [1]   Past Surgical History:  Procedure Laterality Date    AK CYSTOSCOPY,INSERT URETERAL STENT Right 7/8/2025    Procedure: CYSTOSCOPY WITH RIGHT URETEROSCOPY LASER LITHOTRIPSY AND URETERAL STENT PLACEMENT BLADDER BIOPSY;  Surgeon: Nayan Cobian M.D.;  Location: SURGERY SAME DAY [facility];  Service: Urology    AK CYSTOURETHROSCOPY,BIOPSIES N/A 7/8/2025    Procedure: CYSTOSCOPY, WITH BLADDER BIOPSY;  Surgeon: Nayan Cobian M.D.;  Location: SURGERY SAME DAY [facility];  Service: Urology    PB TOTAL KNEE ARTHROPLASTY Right 5/1/2024    Procedure: RIGHT TOTAL KNEE ARTHROPLASTY;  Surgeon: Juno Templeton M.D.;  Location: [facility];  Service: Orthopedics    ORIF, SHOULDER Right    [2]   Past Medical History:  Diagnosis Date    Arthritis     Bowel habit changes     diarrhea and constipation    Cataract     no surgery yet    Dental disorder     retainer at night    Heart burn     High cholesterol     Indigestion     Pneumonia     as a teenager    Renal disorder     kidney stones    Seasonal allergies     Urinary incontinence    [3]   Current Outpatient Medications   Medication Sig Dispense Refill    tamsulosin (FLOMAX) 0.4 MG capsule Take 1 Capsule by mouth 1/2 hour after breakfast for 90 days. 30 Capsule 0    oxybutynin SR (DITROPAN-XL) 10 MG CR  tablet Take 1 Tablet by mouth every day. 14 Tablet 0    sulfamethoxazole-trimethoprim (BACTRIM DS) 800-160 MG tablet Take 1 Tablet by mouth 2 times a day for 7 days. 14 Tablet 0    multivitamin Tab Take 1 Tablet by mouth every day.      CALCIUM PO Take  by mouth every day.      Ascorbic Acid (VITAMIN C PO) Take  by mouth every day.      Cyanocobalamin (VITAMIN B-12 PO) Take  by mouth every day.      CRANBERRY PO Take  by mouth every day.      TURMERIC PO Take  by mouth every day.      telmisartan (MICARDIS) 20 MG tablet Take 1 Tablet by mouth every day. 100 Tablet 2    estradiol (ESTRACE) 0.1 MG/GM vaginal cream 10 mcg application daily x 2 weeks.  Then apply Monday, Wednesday and Friday indefinitely for maintenance. 42.5 g 1    Fluticasone Propionate (FLONASE NA) Administer 2 Sprays into affected nostril(S) every day.      loratadine (CLARITIN) 10 MG Tab Take 10 mg by mouth every day.       No current facility-administered medications for this visit.   [4]   Allergies  Allergen Reactions    Oxycodone Vomiting

## 2025-07-22 ENCOUNTER — HOSPITAL ENCOUNTER (OUTPATIENT)
Dept: LAB | Facility: MEDICAL CENTER | Age: 65
End: 2025-07-22
Attending: PHYSICIAN ASSISTANT
Payer: MEDICARE

## 2025-07-22 ENCOUNTER — OFFICE VISIT (OUTPATIENT)
Dept: MEDICAL GROUP | Facility: MEDICAL CENTER | Age: 65
End: 2025-07-22
Payer: MEDICARE

## 2025-07-22 ENCOUNTER — HOSPITAL ENCOUNTER (OUTPATIENT)
Facility: MEDICAL CENTER | Age: 65
End: 2025-07-22
Attending: PHYSICIAN ASSISTANT
Payer: MEDICARE

## 2025-07-22 ENCOUNTER — OFFICE VISIT (OUTPATIENT)
Dept: UROLOGY | Facility: MEDICAL CENTER | Age: 65
End: 2025-07-22
Payer: MEDICARE

## 2025-07-22 VITALS
TEMPERATURE: 98.2 F | HEART RATE: 92 BPM | BODY MASS INDEX: 25.03 KG/M2 | DIASTOLIC BLOOD PRESSURE: 60 MMHG | WEIGHT: 165.12 LBS | RESPIRATION RATE: 18 BRPM | HEIGHT: 68 IN | SYSTOLIC BLOOD PRESSURE: 104 MMHG | OXYGEN SATURATION: 95 %

## 2025-07-22 VITALS
OXYGEN SATURATION: 98 % | SYSTOLIC BLOOD PRESSURE: 124 MMHG | DIASTOLIC BLOOD PRESSURE: 84 MMHG | TEMPERATURE: 98.6 F | HEART RATE: 110 BPM

## 2025-07-22 DIAGNOSIS — R35.0 URINARY FREQUENCY: ICD-10-CM

## 2025-07-22 DIAGNOSIS — N93.9 VAGINAL BLEEDING: Primary | ICD-10-CM

## 2025-07-22 DIAGNOSIS — R10.9 FLANK PAIN: ICD-10-CM

## 2025-07-22 DIAGNOSIS — N20.0 KIDNEY STONE: Primary | ICD-10-CM

## 2025-07-22 DIAGNOSIS — N39.0 RECURRENT UTI: ICD-10-CM

## 2025-07-22 DIAGNOSIS — R31.0 GROSS HEMATURIA: ICD-10-CM

## 2025-07-22 LAB
ALBUMIN SERPL BCP-MCNC: 3.4 G/DL (ref 3.2–4.9)
ALBUMIN/GLOB SERPL: 0.8 G/DL
ALP SERPL-CCNC: 165 U/L (ref 30–99)
ALT SERPL-CCNC: 60 U/L (ref 2–50)
ANION GAP SERPL CALC-SCNC: 15 MMOL/L (ref 7–16)
APPEARANCE UR: NORMAL
AST SERPL-CCNC: 39 U/L (ref 12–45)
BASOPHILS # BLD AUTO: 0.5 % (ref 0–1.8)
BASOPHILS # BLD: 0.08 K/UL (ref 0–0.12)
BILIRUB SERPL-MCNC: 0.6 MG/DL (ref 0.1–1.5)
BILIRUB UR STRIP-MCNC: NEGATIVE MG/DL
BUN SERPL-MCNC: 17 MG/DL (ref 8–22)
CALCIUM ALBUM COR SERPL-MCNC: 10.3 MG/DL (ref 8.5–10.5)
CALCIUM SERPL-MCNC: 9.8 MG/DL (ref 8.5–10.5)
CHLORIDE SERPL-SCNC: 97 MMOL/L (ref 96–112)
CO2 SERPL-SCNC: 18 MMOL/L (ref 20–33)
COLOR UR AUTO: YELLOW
CREAT SERPL-MCNC: 0.96 MG/DL (ref 0.5–1.4)
EOSINOPHIL # BLD AUTO: 0.02 K/UL (ref 0–0.51)
EOSINOPHIL NFR BLD: 0.1 % (ref 0–6.9)
ERYTHROCYTE [DISTWIDTH] IN BLOOD BY AUTOMATED COUNT: 48.2 FL (ref 35.9–50)
GFR SERPLBLD CREATININE-BSD FMLA CKD-EPI: 65 ML/MIN/1.73 M 2
GLOBULIN SER CALC-MCNC: 4.1 G/DL (ref 1.9–3.5)
GLUCOSE SERPL-MCNC: 95 MG/DL (ref 65–99)
GLUCOSE UR STRIP.AUTO-MCNC: NEGATIVE MG/DL
HCT VFR BLD AUTO: 40.9 % (ref 37–47)
HGB BLD-MCNC: 12.9 G/DL (ref 12–16)
IMM GRANULOCYTES # BLD AUTO: 0.14 K/UL (ref 0–0.11)
IMM GRANULOCYTES NFR BLD AUTO: 0.9 % (ref 0–0.9)
KETONES UR STRIP.AUTO-MCNC: NORMAL MG/DL
LEUKOCYTE ESTERASE UR QL STRIP.AUTO: NORMAL
LIPASE SERPL-CCNC: 18 U/L (ref 11–82)
LYMPHOCYTES # BLD AUTO: 0.71 K/UL (ref 1–4.8)
LYMPHOCYTES NFR BLD: 4.7 % (ref 22–41)
MCH RBC QN AUTO: 33.2 PG (ref 27–33)
MCHC RBC AUTO-ENTMCNC: 31.5 G/DL (ref 32.2–35.5)
MCV RBC AUTO: 105.1 FL (ref 81.4–97.8)
MONOCYTES # BLD AUTO: 1.47 K/UL (ref 0–0.85)
MONOCYTES NFR BLD AUTO: 9.7 % (ref 0–13.4)
NEUTROPHILS # BLD AUTO: 12.69 K/UL (ref 1.82–7.42)
NEUTROPHILS NFR BLD: 84.1 % (ref 44–72)
NITRITE UR QL STRIP.AUTO: NEGATIVE
NRBC # BLD AUTO: 0 K/UL
NRBC BLD-RTO: 0 /100 WBC (ref 0–0.2)
PH UR STRIP.AUTO: 5.5 [PH] (ref 5–8)
PLATELET # BLD AUTO: 269 K/UL (ref 164–446)
PMV BLD AUTO: 9.5 FL (ref 9–12.9)
POC POST-VOID: 15 ML
POC PRE-VOID: NORMAL
POTASSIUM SERPL-SCNC: 4.8 MMOL/L (ref 3.6–5.5)
PROT SERPL-MCNC: 7.5 G/DL (ref 6–8.2)
PROT UR QL STRIP: NORMAL MG/DL
RBC # BLD AUTO: 3.89 M/UL (ref 4.2–5.4)
RBC UR QL AUTO: NORMAL
SODIUM SERPL-SCNC: 130 MMOL/L (ref 135–145)
SP GR UR STRIP.AUTO: <=1.005
UROBILINOGEN UR STRIP-MCNC: NORMAL MG/DL
WBC # BLD AUTO: 15.1 K/UL (ref 4.8–10.8)

## 2025-07-22 PROCEDURE — 3074F SYST BP LT 130 MM HG: CPT | Performed by: PHYSICIAN ASSISTANT

## 2025-07-22 PROCEDURE — 87186 SC STD MICRODIL/AGAR DIL: CPT

## 2025-07-22 PROCEDURE — 99214 OFFICE O/P EST MOD 30 MIN: CPT | Performed by: PHYSICIAN ASSISTANT

## 2025-07-22 PROCEDURE — 81002 URINALYSIS NONAUTO W/O SCOPE: CPT | Performed by: FAMILY MEDICINE

## 2025-07-22 PROCEDURE — 87077 CULTURE AEROBIC IDENTIFY: CPT

## 2025-07-22 PROCEDURE — 99214 OFFICE O/P EST MOD 30 MIN: CPT | Performed by: FAMILY MEDICINE

## 2025-07-22 PROCEDURE — 36415 COLL VENOUS BLD VENIPUNCTURE: CPT

## 2025-07-22 PROCEDURE — 3079F DIAST BP 80-89 MM HG: CPT | Performed by: PHYSICIAN ASSISTANT

## 2025-07-22 PROCEDURE — 85025 COMPLETE CBC W/AUTO DIFF WBC: CPT

## 2025-07-22 PROCEDURE — 80053 COMPREHEN METABOLIC PANEL: CPT

## 2025-07-22 PROCEDURE — 83690 ASSAY OF LIPASE: CPT

## 2025-07-22 PROCEDURE — 3074F SYST BP LT 130 MM HG: CPT | Performed by: FAMILY MEDICINE

## 2025-07-22 PROCEDURE — 3078F DIAST BP <80 MM HG: CPT | Performed by: FAMILY MEDICINE

## 2025-07-22 PROCEDURE — 51798 US URINE CAPACITY MEASURE: CPT | Performed by: PHYSICIAN ASSISTANT

## 2025-07-22 PROCEDURE — 87086 URINE CULTURE/COLONY COUNT: CPT

## 2025-07-22 RX ORDER — CEFDINIR 300 MG/1
300 CAPSULE ORAL 2 TIMES DAILY
Qty: 20 CAPSULE | Refills: 0 | Status: SHIPPED | OUTPATIENT
Start: 2025-07-22 | End: 2025-08-01

## 2025-07-22 ASSESSMENT — ENCOUNTER SYMPTOMS
HEADACHES: 0
BACK PAIN: 1
SORE THROAT: 0
PALPITATIONS: 0
FLANK PAIN: 1
DIARRHEA: 1
FOCAL WEAKNESS: 0
COUGH: 0
VOMITING: 0
NAUSEA: 0
WEAKNESS: 0
SHORTNESS OF BREATH: 0
FEVER: 0
POLYDIPSIA: 0
ABDOMINAL PAIN: 1
CHILLS: 0

## 2025-07-22 ASSESSMENT — FIBROSIS 4 INDEX: FIB4 SCORE: 2.14

## 2025-07-22 NOTE — PROGRESS NOTES
Subjective  Sandhya Christine is a 65 y.o. female who presents today with 4 days of myalgias, fever chills with Tmax of 102.5 and right upper quadrant abdominal pain and flank pain.  She denies any dysuria but has noted hematuria.  No nausea, vomiting but she has had some diarrhea.  Pain is described as sharp and constant and progressively worsening over the last 4 days.  She denies any recent cough, pharyngitis, nasal congestion or URI symptoms.  Tylenol and ibuprofen taken roughly 5 hours ago.  Appetite has been decreased.  She is roughly 2 weeks post cystoscopy, lithotripsy, ureteral stent placement and bladder biopsy with Dr. Cobian.  She was in the office a week ago for her 1 week follow-up appointment and stent removal.  Bladder biopsy was discussed and benign.  She was given.  Procedure Bactrim treatment x 1.  Apparently she presented to urgent care this morning and was advised to either go to the ED or come to our office        Family History   Problem Relation Age of Onset    No Known Problems Mother     Cancer Father         esophageal    Throat Cancer Father     No Known Problems Sister     No Known Problems Brother        Social History     Socioeconomic History    Marital status:      Spouse name: Not on file    Number of children: Not on file    Years of education: Not on file    Highest education level: Not on file   Occupational History    Not on file   Tobacco Use    Smoking status: Never     Passive exposure: Past    Smokeless tobacco: Never   Vaping Use    Vaping status: Never Used   Substance and Sexual Activity    Alcohol use: Yes     Alcohol/week: 12.0 oz     Types: 20 Glasses of wine per week     Comment: 3 glasses per day    Drug use: Not Currently     Comment: not since high school    Sexual activity: Not on file   Other Topics Concern    Not on file   Social History Narrative    Not on file     Social Drivers of Health     Financial Resource Strain: Not on file   Food Insecurity:  Not on file   Transportation Needs: Not on file   Physical Activity: Not on file   Stress: Not on file   Social Connections: Not on file   Intimate Partner Violence: Not on file   Housing Stability: Not on file       Past Surgical History[1]    Past Medical History[2]    Current Medications[3]    Allergies[4]      Review of Systems   Constitutional:  Negative for chills and fever.   HENT:  Negative for congestion and sore throat.    Respiratory:  Negative for cough and shortness of breath.    Cardiovascular:  Negative for chest pain, palpitations and leg swelling.   Gastrointestinal:  Positive for abdominal pain and diarrhea. Negative for nausea and vomiting.   Genitourinary:  Positive for flank pain and hematuria. Negative for dysuria, frequency and urgency.   Musculoskeletal:  Positive for back pain.   Skin:  Negative for itching and rash.   Neurological:  Negative for focal weakness, weakness and headaches.   Endo/Heme/Allergies:  Negative for polydipsia.        Objective  /84 (BP Location: Left arm, Patient Position: Sitting, BP Cuff Size: Adult)   Pulse (!) 110   Temp 37 °C (98.6 °F) (Temporal)   SpO2 98%     Physical Exam  Vitals and nursing note reviewed. Exam conducted with a chaperone present.   Constitutional:       Appearance: Normal appearance.      Comments: Pleasant nontoxic ambulatory female   HENT:      Head: Normocephalic and atraumatic.      Nose: Nose normal.      Mouth/Throat:      Mouth: Mucous membranes are moist.   Eyes:      Conjunctiva/sclera: Conjunctivae normal.      Pupils: Pupils are equal, round, and reactive to light.   Cardiovascular:      Rate and Rhythm: Normal rate and regular rhythm.      Heart sounds: Normal heart sounds.   Pulmonary:      Effort: Pulmonary effort is normal.      Breath sounds: Normal breath sounds.   Abdominal:      General: There is no distension.      Palpations: Abdomen is soft.      Tenderness: There is abdominal tenderness. There is right CVA  tenderness. There is no left CVA tenderness or guarding.      Comments: Epigastric and right upper quadrant abdominal pain without masses guarding or rigidity   Musculoskeletal:         General: Normal range of motion.      Cervical back: Normal range of motion and neck supple.      Right lower leg: No edema.      Left lower leg: No edema.   Skin:     General: Skin is warm and dry.      Capillary Refill: Capillary refill takes less than 2 seconds.   Neurological:      General: No focal deficit present.      Mental Status: She is alert and oriented to person, place, and time.   Psychiatric:         Mood and Affect: Mood normal.         Behavior: Behavior normal.         Thought Content: Thought content normal.         Judgment: Judgment normal.           Labs:   POCT UA   Lab Results   Component Value Date/Time    POCCOLOR yellow 07/22/2025 09:08 AM    POCAPPEAR slightly cloudy 07/22/2025 09:08 AM    POCLEUKEST moderate 07/22/2025 09:08 AM    POCNITRITE negative 07/22/2025 09:08 AM    POCUROBILIGE 0.2 E.U. 07/22/2025 09:08 AM    POCPROTEIN 100mg/dl 07/22/2025 09:08 AM    POCURPH 5.5 07/22/2025 09:08 AM    POCBLOOD small 07/22/2025 09:08 AM    POCSPGRV <=1.005 07/22/2025 09:08 AM    POCKETONES trace 07/22/2025 09:08 AM    POCBILIRUBIN negative 07/22/2025 09:08 AM    POCGLUCUA negative 07/22/2025 09:08 AM      PVR 15 ml    Imaging:   None available    Assessment  - Right flank pain, acute  - Right upper quadrant abdominal pain, acute  - Fever  - Hematuria        PLAN/MDM    Differential Dx:   Pyelonephritis, UTI, ureterolithiasis, nephrolithiasis, renal colic, choledocholithiasis, cholelithiasis, pancreatitis, musculoskeletal pain      MDM:   Sandhya Christine is a 65 y.o. female who presents today with 4 days of myalgias, fever chills with Tmax of 102.5 and right upper quadrant abdominal pain and flank pain.  She denies any dysuria but has noted hematuria.  No nausea, vomiting but she has had some diarrhea.  Pain  is described as sharp and constant and progressively worsening over the last 4 days.  She denies any recent cough, pharyngitis, nasal congestion or URI symptoms.  Tylenol and ibuprofen taken roughly 5 hours ago.  Appetite has been decreased.  She is roughly 2 weeks post cystoscopy, lithotripsy, ureteral stent placement and bladder biopsy with Dr. Cobian.  She was in the office a week ago for her 1 week follow-up appointment and stent removal.  Bladder biopsy was discussed and benign.  She was given.  Procedure Bactrim treatment x 1.  Apparently she presented to urgent care this morning and was advised to either go to the ED or come to our office.  Patient is slightly tachycardic at 110, vitals are otherwise stable.  On exam she is pleasant awake alert ambulatory nontoxic.  She does have right upper quadrant abdominal pain and epigastric discomfort without masses guarding or rigidity.  Mild right CVA tenderness noted.  Lungs are clear throughout.  Regular rate and rhythm.  No peripheral edema.  Distal pulses intact bilateral lower extremities.  Urinalysis obtained from urgent care this morning shows only small blood, moderate LE and trace ketones.  PVR today 15 mL.  In the setting of recent surgical interventions and stent removal CT abdomen pelvis without will be ordered stat.  I did also obtain CMP, lipase, CBC and urine culture.  Out of an abundance of caution patient was placed on Omnicef as well and this will be begun after urine culture collection.  I will see the patient back in clinic tomorrow to follow-up on these results, ED return precautions discussed and understood.    Given the careful history/physical and the entirety of the evaluation, I feel that the patient at this time is stable for further outpatient follow-up regarding the issue we discussed.  The plan and thought processes were discussed at length with the patient.  Patient agrees to obtain follow-up as discussed at length.  If the patient needs  a referral to outside specialist or primary care, a referral was provided.  I instructed the patient on ED return precautions if there are any new, worsening or otherwise concerning symptoms.  Patient expressed understanding and agreement with this plan verbally.  Opportunity was given for questions prior to discharge and all stated questions were answered to the patient's satisfaction.  Upon departure, vital signs were reassessed by me and reconciled.  (this note was partially dictated utilizing Dragon Nuance technology.  Effort was made to proofread the document prior to signing, however grammatical and/or contextual errors are still possible and may be present after signing).    Patient was counseled in person regarding the patient's stable condition, test results, diagnoses and need for additional testing and follow-up as discussed.  Patient agrees with plan of care.  Concerns were addressed.    MIPS:   Blood pressure was reviewed.  Patient was counseled on the importance of follow-up with their primary care provider regarding further blood pressure monitoring management as appropriate.    Tobacco use history was reviewed and appropriate counseling/materials available were provided where indicated.    DIAGNOSTICS: Laboratory(if available/applicable) and imaging studies (if available/applicable) were independently reviewed and interpreted by me.  These include CT abdomen pelvis without, urine culture, lipase, CBC, CMP    MEDICAL MANAGEMENT (IF APPLICABLE): The patient required prescription management, Omnicef 300 mg p.o. twice daily for 10 days    PROCEDURE/SURGERY PLANNED/DISCUSSED: N/A    HISTORY: History is obtained primarily from patient directly in order to gain clinically pertinent information    SOCIAL HISTORY: Based on my evaluation, diagnosis/treatment of the patient's condition is significantly limited by certain social determinants of health including none identified    DISPOSITION: Patient discharged  home to obtain outpatient workup, follow-up in office tomorrow    Problem List Items Addressed This Visit          Adult Urology Medicine Problems    Recurrent UTI    Relevant Orders    POCT Bladder Scan (Completed)     Other Visit Diagnoses         Kidney stone    -  Primary    Relevant Orders    POCT Bladder Scan (Completed)      Gross hematuria        Relevant Orders    POCT Bladder Scan (Completed)      Flank pain        Relevant Orders    CT-ABDOMEN-PELVIS W/O    URINE CULTURE(NEW)    CBC WITH DIFFERENTIAL    Comp Metabolic Panel    LIPASE                   [1]   Past Surgical History:  Procedure Laterality Date    MD CYSTOSCOPY,INSERT URETERAL STENT Right 07/08/2025    Procedure: CYSTOSCOPY WITH RIGHT URETEROSCOPY LASER LITHOTRIPSY AND URETERAL STENT PLACEMENT BLADDER BIOPSY;  Surgeon: Nayan Cobian M.D.;  Location: SURGERY SAME DAY [facility];  Service: Urology    MD CYSTOURETHROSCOPY,BIOPSIES N/A 07/08/2025    Procedure: CYSTOSCOPY, WITH BLADDER BIOPSY;  Surgeon: Nayan Cobian M.D.;  Location: SURGERY SAME DAY [facility];  Service: Urology    PB TOTAL KNEE ARTHROPLASTY Right 05/01/2024    Procedure: RIGHT TOTAL KNEE ARTHROPLASTY;  Surgeon: Juno Templeton M.D.;  Location: [facility];  Service: Orthopedics    ORIF, KNEE  8/8/2014    ORIF, SHOULDER Right    [2]   Past Medical History:  Diagnosis Date    Arthritis     Bowel habit changes     diarrhea and constipation    Cataract     no surgery yet    Dental disorder     retainer at night    Heart burn     High cholesterol     Indigestion     Pneumonia     as a teenager    Renal disorder     kidney stones    Seasonal allergies     Urinary incontinence    [3]   Current Outpatient Medications   Medication Sig Dispense Refill    cefdinir (OMNICEF) 300 MG Cap Take 1 Capsule by mouth 2 times a day for 10 days. 20 Capsule 0    tamsulosin (FLOMAX) 0.4 MG capsule Take 1 Capsule by mouth 1/2 hour after breakfast for 90 days. 30 Capsule 0    oxybutynin SR  (DITROPAN-XL) 10 MG CR tablet Take 1 Tablet by mouth every day. 14 Tablet 0    multivitamin Tab Take 1 Tablet by mouth every day.      CALCIUM PO Take  by mouth every day.      Ascorbic Acid (VITAMIN C PO) Take  by mouth every day.      Cyanocobalamin (VITAMIN B-12 PO) Take  by mouth every day.      CRANBERRY PO Take  by mouth every day.      TURMERIC PO Take  by mouth every day.      telmisartan (MICARDIS) 20 MG tablet Take 1 Tablet by mouth every day. 100 Tablet 2    estradiol (ESTRACE) 0.1 MG/GM vaginal cream 10 mcg application daily x 2 weeks.  Then apply Monday, Wednesday and Friday indefinitely for maintenance. 42.5 g 1    Fluticasone Propionate (FLONASE NA) Administer 2 Sprays into affected nostril(S) every day.      loratadine (CLARITIN) 10 MG Tab Take 10 mg by mouth every day.       No current facility-administered medications for this visit.   [4]   Allergies  Allergen Reactions    Oxycodone Vomiting

## 2025-07-22 NOTE — PROGRESS NOTES
This medical record contains text that has been entered with the assistance of computer voice recognition and dictation software.  Therefore, it may contain unintended errors in text, spelling, punctuation, or grammar        Chief Complaint   Patient presents with    URI     Fever with shaking and sweats 102.5 Sunday and yesterday am, lisght cough, body aches, yesterday pt felt RUQ abdm pain, abdomen feels distended and hard since Sat.  Pt underwent surgery last week Kidney stones were removed and the stent was removed last week.     Vaginal Bleeding     Pt noticed a tint of blood last nigh on her pad.        Sandhya Christine is a 65 y.o. female here evaluation and management of:       History of Present Illness  The patient presents with fever, body aches, and right upper quadrant abdominal pain with distension within the last 24 hours. She reports non-vaginal bleeding on her panty liner and denies cough.    On 07/08/2025, diagnosed with kidney stones after hematuria. UTI test was negative. Imaging revealed a 9 mm stone in the right kidney. Underwent cystoscopy and stone fragmentation by Dr. Campos on 07/08/2025. Two stones were found, and a stent was placed. Stent removed on 07/14/2025. Advised to return in 6 weeks for ultrasound and to contact office for signs of infection. Symptoms began on a Saturday, did not contact office.    PAST SURGICAL HISTORY:  - Cystoscopy and stone fragmentation on 07/08/2025  - Ureteral stent placement on 07/08/2025  - Ureteral stent removal on 07/14/2025        Current Medications[1]  Patient Active Problem List    Diagnosis Date Noted    Arthritis of right knee 01/25/2024    Acute conjunctivitis of right eye 08/10/2023    Elevated systolic blood pressure reading with diagnosis of hypertension 08/10/2023    Recurrent UTI 08/10/2023    Vaginal atrophy 08/10/2023     Past Surgical History[2]   Social History[3]  Family History   Problem Relation Age of Onset    No Known Problems  "Mother     Cancer Father         esophageal    Throat Cancer Father     No Known Problems Sister     No Known Problems Brother            ROS    all review of system completed and negative except for those listed above     Objective:     /60 (BP Location: Left arm, Patient Position: Sitting, BP Cuff Size: Adult)   Pulse 92   Temp 36.8 °C (98.2 °F) (Temporal)   Resp 18   Ht 1.727 m (5' 8\")   Wt 74.9 kg (165 lb 2 oz)   SpO2 95%  Body mass index is 25.11 kg/m².  Physical Exam:        GEN: comfortable, alert and oriented, well nourished, well developed, in no apparent distress   HEENT: NCAT, eyes: pupils equal and reactive, sclera white, EOMIT, good dentition  HEART: limbs warm and well perfused, regular rate, no JVD, no lower extremity edema  LUNGS: speaking in full sentences, not in apparent respiratory distress, no audible wheezes  MSK: normal tone and bulk, no swelling of the joints, gait steady and normal       Assessment and Plan:   The following treatment plan was discussed        Assessment & Plan  Vaginal bleeding    Orders:    POCT Urinalysis    Urinary frequency    Orders:    POCT Urinalysis        Assessment & Plan  Postoperative fever  Fever following recent kidney stone surgery and stent removal.    Diagnostic plan: Advised immediate visit to urologist or proceed to ER for urologist evaluation.    Treatment plan: Advised immediate visit to urologist or proceed to ER for urologist evaluation.    Follow-up: Immediate visit to urologist or proceed to ER.          Instructed to follow up if symptoms worsen or fail to improve, ER/UC precautions discussed as well    Jennifer Lynne MD  [facility], Family Medicine   [address] [place] [address]   [place] [address]  Phone: [phone]                    [1]   Current Outpatient Medications   Medication Sig Dispense Refill    tamsulosin (FLOMAX) 0.4 MG capsule Take 1 Capsule by mouth 1/2 hour after breakfast for 90 days. 30 Capsule 0    oxybutynin SR " (DITROPAN-XL) 10 MG CR tablet Take 1 Tablet by mouth every day. 14 Tablet 0    multivitamin Tab Take 1 Tablet by mouth every day.      CALCIUM PO Take  by mouth every day.      Ascorbic Acid (VITAMIN C PO) Take  by mouth every day.      Cyanocobalamin (VITAMIN B-12 PO) Take  by mouth every day.      CRANBERRY PO Take  by mouth every day.      TURMERIC PO Take  by mouth every day.      telmisartan (MICARDIS) 20 MG tablet Take 1 Tablet by mouth every day. 100 Tablet 2    estradiol (ESTRACE) 0.1 MG/GM vaginal cream 10 mcg application daily x 2 weeks.  Then apply Monday, Wednesday and Friday indefinitely for maintenance. 42.5 g 1    Fluticasone Propionate (FLONASE NA) Administer 2 Sprays into affected nostril(S) every day.      loratadine (CLARITIN) 10 MG Tab Take 10 mg by mouth every day.      cefdinir (OMNICEF) 300 MG Cap Take 1 Capsule by mouth 2 times a day for 10 days. 20 Capsule 0     No current facility-administered medications for this visit.   [2]   Past Surgical History:  Procedure Laterality Date    DE CYSTOSCOPY,INSERT URETERAL STENT Right 07/08/2025    Procedure: CYSTOSCOPY WITH RIGHT URETEROSCOPY LASER LITHOTRIPSY AND URETERAL STENT PLACEMENT BLADDER BIOPSY;  Surgeon: Nayan Cobian M.D.;  Location: SURGERY SAME DAY [facility];  Service: Urology    DE CYSTOURETHROSCOPY,BIOPSIES N/A 07/08/2025    Procedure: CYSTOSCOPY, WITH BLADDER BIOPSY;  Surgeon: Nayan Cobian M.D.;  Location: SURGERY SAME DAY [facility];  Service: Urology    PB TOTAL KNEE ARTHROPLASTY Right 05/01/2024    Procedure: RIGHT TOTAL KNEE ARTHROPLASTY;  Surgeon: Juno Templeton M.D.;  Location: [facility];  Service: Orthopedics    ORIF, KNEE  8/8/2014    ORIF, SHOULDER Right    [3]   Social History  Tobacco Use    Smoking status: Never     Passive exposure: Past    Smokeless tobacco: Never   Vaping Use    Vaping status: Never Used   Substance Use Topics    Alcohol use: Yes     Alcohol/week: 12.0 oz     Types: 20 Glasses of wine  per week     Comment: 3 glasses per day    Drug use: Not Currently     Comment: not since high school

## 2025-07-22 NOTE — PATIENT INSTRUCTIONS
Please obtain CT imaging and lab work as discussed.  I will see you back in the office in 24 hours to follow-up on these results.  Please go to the ED with any new or worsening symptoms

## 2025-07-23 ENCOUNTER — HOSPITAL ENCOUNTER (OUTPATIENT)
Dept: RADIOLOGY | Facility: MEDICAL CENTER | Age: 65
End: 2025-07-23
Attending: PHYSICIAN ASSISTANT
Payer: MEDICARE

## 2025-07-23 DIAGNOSIS — R10.9 FLANK PAIN: ICD-10-CM

## 2025-07-23 PROCEDURE — 74176 CT ABD & PELVIS W/O CONTRAST: CPT

## 2025-07-24 ENCOUNTER — TELEPHONE (OUTPATIENT)
Dept: UROLOGY | Facility: MEDICAL CENTER | Age: 65
End: 2025-07-24
Payer: MEDICARE

## 2025-07-24 DIAGNOSIS — N39.0 RECURRENT UTI: Primary | ICD-10-CM

## 2025-07-24 DIAGNOSIS — N20.0 KIDNEY STONE: ICD-10-CM

## 2025-07-24 RX ORDER — SULFAMETHOXAZOLE AND TRIMETHOPRIM 800; 160 MG/1; MG/1
1 TABLET ORAL 2 TIMES DAILY
Qty: 28 TABLET | Refills: 0 | Status: SHIPPED | OUTPATIENT
Start: 2025-07-24 | End: 2025-08-07

## 2025-07-24 RX ORDER — METHYLPREDNISOLONE 4 MG/1
TABLET ORAL
Qty: 21 TABLET | Refills: 0 | Status: SHIPPED | OUTPATIENT
Start: 2025-07-24

## 2025-07-24 NOTE — TELEPHONE ENCOUNTER
She has been having cyclical fevers and chills overnight with fatigue and right flank pain. CT renal colic shows mild hydronephrosis and inflammation of the right kidney. She has been taking cefdinir for 48 hours but has not noticed any significant improvement. I will switch her antibiotic to Bactrim and send in a medrol dose pack to help with potential ureteritis contributing to the mild hydronephrosis. We discussed that if she has a fever greater than 101.5 or persistent fever, or feels worse that she should go to the ED. She has an appointment scheduled for tomorrow for re-evaluation as well. Urine culture is still pending.

## 2025-07-25 ENCOUNTER — APPOINTMENT (OUTPATIENT)
Dept: UROLOGY | Facility: MEDICAL CENTER | Age: 65
End: 2025-07-25
Payer: MEDICARE

## 2025-08-19 ENCOUNTER — HOSPITAL ENCOUNTER (OUTPATIENT)
Dept: RADIOLOGY | Facility: MEDICAL CENTER | Age: 65
End: 2025-08-19
Attending: STUDENT IN AN ORGANIZED HEALTH CARE EDUCATION/TRAINING PROGRAM
Payer: MEDICARE

## 2025-08-19 DIAGNOSIS — N20.0 KIDNEY STONE: ICD-10-CM

## 2025-08-19 DIAGNOSIS — N20.0 KIDNEY STONE: Primary | ICD-10-CM

## 2025-08-19 PROCEDURE — 76775 US EXAM ABDO BACK WALL LIM: CPT

## 2025-08-25 ENCOUNTER — APPOINTMENT (OUTPATIENT)
Dept: UROLOGY | Facility: MEDICAL CENTER | Age: 65
End: 2025-08-25
Payer: MEDICARE

## (undated) DEVICE — PACK CYSTO III (4EA/CA)

## (undated) DEVICE — SET LEADWIRE 5 LEAD BEDSIDE DISPOSABLE ECG (1SET OF 5/EA)

## (undated) DEVICE — LACTATED RINGERS INJ 1000 ML - (14EA/CA 60CA/PF)

## (undated) DEVICE — BAG DRAINAGE LINGEMAN CYSTO/URO (20EA/CA)

## (undated) DEVICE — KIT  I.V. START (100EA/CA)

## (undated) DEVICE — MASK OXYGEN VNYL ADLT MED CONC WITH 7 FOOT TUBING - (50EA/CA)

## (undated) DEVICE — SCOPE DIGITAL URETEROSCOPE DISPOSABLE (10EA/PK)

## (undated) DEVICE — GOWN WARMING STANDARD FLEX - (30/CA)

## (undated) DEVICE — SUCTION INSTRUMENT YANKAUER BULBOUS TIP W/O VENT (50EA/CA)

## (undated) DEVICE — FIBER LASER MOSES 200 UM (1/EA)

## (undated) DEVICE — CANISTER SUCTION RIGID RED 1500CC (40EA/CA)

## (undated) DEVICE — TUBE CONNECTING SUCTION - CLEAR PLASTIC STERILE 72 IN (50EA/CA)

## (undated) DEVICE — GLOVE SZ 7 BIOGEL PI MICRO - PF LF (50PR/BX 4BX/CA)

## (undated) DEVICE — CANISTER SUCTION 3000ML MECHANICAL FILTER AUTO SHUTOFF MEDI-VAC NONSTERILE LF DISP (40EA/CA)

## (undated) DEVICE — TOWEL STOP TIMEOUT SAFETY FLAG (40EA/CA)

## (undated) DEVICE — GOWN SURGEONS X-LARGE - DISP. (30/CA)

## (undated) DEVICE — CONNECTOR HOSE NEPTUNE FOR CYSTO ROOM

## (undated) DEVICE — DEVICE STONE RETRIEVAL DAKOTA OPENSURE FLEXIBLE BASKET 1.9FRX120CM (1EA)

## (undated) DEVICE — WIRE GUIDE SENSOR DUAL FLEX - 5/BX

## (undated) DEVICE — GLOVE SZ 7.5 BIOGEL PI MICRO - PF LF (50PR/BX)

## (undated) DEVICE — SPONGE GAUZESTER 4 X 4 4PLY - (128PK/CA)

## (undated) DEVICE — SLEEVE VASO DVT COMPRESSION CALF MED - (10PR/CA)

## (undated) DEVICE — SODIUM CHL IRRIGATION 0.9% 1000ML (12EA/CA)

## (undated) DEVICE — CANNULA O2 COMFORT SOFT EAR ADULT 7 FT TUBING (50/CA)

## (undated) DEVICE — ELECTRODE DUAL RETURN W/ CORD - (50/PK)

## (undated) DEVICE — SHEATH NAVIGATOR 11/13 X 36 URETERAL ACCESS (5EA/BX)

## (undated) DEVICE — DRESSING NON-ADHERING 8 X 3 - (50/BX)

## (undated) DEVICE — WATER IRRIGATION STERILE 1000ML (12EA/CA)

## (undated) DEVICE — GLOVE BIOGEL SZ 6.5 SURGICAL PF LTX (50PR/BX 4BX/CA)

## (undated) DEVICE — TUBING CLEARLINK DUO-VENT - C-FLO (48EA/CA)

## (undated) DEVICE — SENSOR OXIMETER ADULT SPO2 RD SET (20EA/BX)